# Patient Record
Sex: FEMALE | Race: WHITE | NOT HISPANIC OR LATINO | Employment: OTHER | URBAN - METROPOLITAN AREA
[De-identification: names, ages, dates, MRNs, and addresses within clinical notes are randomized per-mention and may not be internally consistent; named-entity substitution may affect disease eponyms.]

---

## 2017-02-20 ENCOUNTER — OFFICE VISIT (OUTPATIENT)
Dept: OBGYN | Age: 27
End: 2017-02-20

## 2017-02-20 VITALS
WEIGHT: 164.7 LBS | DIASTOLIC BLOOD PRESSURE: 72 MMHG | SYSTOLIC BLOOD PRESSURE: 110 MMHG | BODY MASS INDEX: 27.44 KG/M2 | HEIGHT: 65 IN

## 2017-02-20 DIAGNOSIS — Z01.419 WELL WOMAN EXAM WITH ROUTINE GYNECOLOGICAL EXAM: Primary | ICD-10-CM

## 2017-02-20 DIAGNOSIS — Z12.4 PAP SMEAR FOR CERVICAL CANCER SCREENING: ICD-10-CM

## 2017-02-20 PROCEDURE — 88142 CYTOPATH C/V THIN LAYER: CPT | Performed by: INTERNAL MEDICINE

## 2017-02-20 PROCEDURE — 99395 PREV VISIT EST AGE 18-39: CPT | Performed by: OBSTETRICS & GYNECOLOGY

## 2017-02-20 RX ORDER — LORATADINE 10 MG/1
10 TABLET ORAL DAILY PRN
COMMUNITY

## 2017-02-23 LAB — CYTOLOGY CVX/VAG DOC THIN PREP: NORMAL

## 2017-06-08 ENCOUNTER — OFFICE VISIT (OUTPATIENT)
Dept: OBGYN | Age: 27
End: 2017-06-08

## 2017-06-08 VITALS
WEIGHT: 159.7 LBS | HEIGHT: 65 IN | SYSTOLIC BLOOD PRESSURE: 102 MMHG | DIASTOLIC BLOOD PRESSURE: 62 MMHG | BODY MASS INDEX: 26.61 KG/M2

## 2017-06-08 DIAGNOSIS — N97.0 OLIGO-OVULATION: Primary | ICD-10-CM

## 2017-06-08 PROBLEM — N97.9 SECONDARY FEMALE INFERTILITY: Status: ACTIVE | Noted: 2017-06-08

## 2017-06-08 PROCEDURE — 99214 OFFICE O/P EST MOD 30 MIN: CPT | Performed by: OBSTETRICS & GYNECOLOGY

## 2017-06-19 ENCOUNTER — LAB SERVICES (OUTPATIENT)
Dept: LAB | Age: 27
End: 2017-06-19

## 2017-06-19 DIAGNOSIS — N97.0 OLIGO-OVULATION: ICD-10-CM

## 2017-06-19 PROCEDURE — 36415 COLL VENOUS BLD VENIPUNCTURE: CPT | Performed by: INTERNAL MEDICINE

## 2017-06-19 PROCEDURE — 84144 ASSAY OF PROGESTERONE: CPT | Performed by: INTERNAL MEDICINE

## 2017-06-20 ENCOUNTER — TELEPHONE (OUTPATIENT)
Dept: OBGYN | Age: 27
End: 2017-06-20

## 2017-06-20 LAB — PROGEST SERPL-MCNC: 10.17 NG/ML

## 2017-07-24 ENCOUNTER — TELEPHONE (OUTPATIENT)
Dept: OBGYN | Age: 27
End: 2017-07-24

## 2017-07-24 DIAGNOSIS — O36.8390 UNABLE TO HEAR FETAL HEART TONES AS REASON FOR ULTRASOUND SCAN: ICD-10-CM

## 2017-07-24 DIAGNOSIS — Z34.80 SUPERVISION OF OTHER NORMAL PREGNANCY, ANTEPARTUM: Primary | ICD-10-CM

## 2017-07-24 RX ORDER — ZINC OXIDE 13 %
CREAM (GRAM) TOPICAL
Status: SHIPPED | COMMUNITY
Start: 2017-07-24 | End: 2017-09-21 | Stop reason: ALTCHOICE

## 2017-08-22 ENCOUNTER — IMAGING SERVICES (OUTPATIENT)
Dept: ULTRASOUND IMAGING | Age: 27
End: 2017-08-22

## 2017-08-22 ENCOUNTER — APPOINTMENT (OUTPATIENT)
Dept: OBGYN | Age: 27
End: 2017-08-22

## 2017-08-22 ENCOUNTER — FIRST OB VISIT (OUTPATIENT)
Dept: OBGYN | Age: 27
End: 2017-08-22

## 2017-08-22 ENCOUNTER — LAB SERVICES (OUTPATIENT)
Dept: LAB | Age: 27
End: 2017-08-22

## 2017-08-22 VITALS
SYSTOLIC BLOOD PRESSURE: 110 MMHG | WEIGHT: 163.1 LBS | DIASTOLIC BLOOD PRESSURE: 60 MMHG | BODY MASS INDEX: 27.17 KG/M2 | HEIGHT: 65 IN

## 2017-08-22 DIAGNOSIS — Z11.3 SCREENING EXAMINATION FOR STD (SEXUALLY TRANSMITTED DISEASE): ICD-10-CM

## 2017-08-22 DIAGNOSIS — Z34.80 SUPERVISION OF OTHER NORMAL PREGNANCY, ANTEPARTUM: ICD-10-CM

## 2017-08-22 DIAGNOSIS — Z34.80 SUPERVISION OF OTHER NORMAL PREGNANCY, ANTEPARTUM: Primary | ICD-10-CM

## 2017-08-22 DIAGNOSIS — O36.8390 UNABLE TO HEAR FETAL HEART TONES AS REASON FOR ULTRASOUND SCAN: ICD-10-CM

## 2017-08-22 PROBLEM — N97.9 SECONDARY FEMALE INFERTILITY: Status: RESOLVED | Noted: 2017-06-08 | Resolved: 2017-08-22

## 2017-08-22 LAB
APPEARANCE UR: CLEAR
BACTERIA #/AREA URNS HPF: ABNORMAL /HPF
BASO+EOS+MONOS # BLD AUTO: 0.7 K/MCL (ref 0.1–1.1)
BASO+EOS+MONOS NFR BLD AUTO: 6 %
BILIRUB UR QL STRIP: NEGATIVE
COLOR UR: YELLOW
DIFFERENTIAL METHOD BLD: ABNORMAL
ERYTHROCYTE [DISTWIDTH] IN BLOOD: 13.4 % (ref 11–15)
GLUCOSE UR STRIP-MCNC: NEGATIVE MG/DL
HCT VFR BLD CALC: 37.6 % (ref 36–46.5)
HGB BLD-MCNC: 12.8 G/DL (ref 12–15.5)
HGB UR QL STRIP: NEGATIVE
HYALINE CASTS #/AREA URNS LPF: ABNORMAL /LPF (ref 0–5)
KETONES UR STRIP-MCNC: 40 MG/DL
LEUKOCYTE ESTERASE UR QL STRIP: NEGATIVE
LYMPHOCYTES # BLD AUTO: 1.5 K/MCL (ref 1–4.8)
LYMPHOCYTES NFR BLD AUTO: 14 %
MCH RBC QN AUTO: 28.1 PG (ref 26–34)
MCHC RBC AUTO-ENTMCNC: 34 G/DL (ref 32–36.5)
MCV RBC AUTO: 82.5 FL (ref 78–100)
NEUTROPHILS # BLD AUTO: 8.3 K/MCL (ref 1.8–7.7)
NEUTROPHILS NFR BLD AUTO: 80 %
NITRITE UR QL STRIP: NEGATIVE
PH UR STRIP: 5.5 UNITS (ref 5–7)
PLATELET # BLD: 239 K/MCL (ref 140–450)
PROT UR STRIP-MCNC: NEGATIVE MG/DL
RBC # BLD: 4.56 MIL/MCL (ref 4–5.2)
RBC #/AREA URNS HPF: ABNORMAL /HPF (ref 0–3)
SP GR UR STRIP: 1.02 (ref 1–1.03)
SPECIMEN SOURCE: ABNORMAL
SQUAMOUS #/AREA URNS HPF: ABNORMAL /HPF (ref 0–5)
UROBILINOGEN UR STRIP-MCNC: 0.2 MG/DL (ref 0–1)
WBC # BLD: 10.5 K/MCL (ref 4.2–11)
WBC #/AREA URNS HPF: ABNORMAL /HPF (ref 0–5)

## 2017-08-22 PROCEDURE — 76817 TRANSVAGINAL US OBSTETRIC: CPT | Performed by: OBSTETRICS & GYNECOLOGY

## 2017-08-22 PROCEDURE — 86901 BLOOD TYPING SEROLOGIC RH(D): CPT | Performed by: INTERNAL MEDICINE

## 2017-08-22 PROCEDURE — 87389 HIV-1 AG W/HIV-1&-2 AB AG IA: CPT | Performed by: INTERNAL MEDICINE

## 2017-08-22 PROCEDURE — 87340 HEPATITIS B SURFACE AG IA: CPT | Performed by: INTERNAL MEDICINE

## 2017-08-22 PROCEDURE — 86762 RUBELLA ANTIBODY: CPT | Performed by: INTERNAL MEDICINE

## 2017-08-22 PROCEDURE — 86900 BLOOD TYPING SEROLOGIC ABO: CPT | Performed by: INTERNAL MEDICINE

## 2017-08-22 PROCEDURE — 85025 COMPLETE CBC W/AUTO DIFF WBC: CPT | Performed by: INTERNAL MEDICINE

## 2017-08-22 PROCEDURE — 86780 TREPONEMA PALLIDUM: CPT | Performed by: INTERNAL MEDICINE

## 2017-08-22 PROCEDURE — 87491 CHLMYD TRACH DNA AMP PROBE: CPT | Performed by: INTERNAL MEDICINE

## 2017-08-22 PROCEDURE — 36415 COLL VENOUS BLD VENIPUNCTURE: CPT | Performed by: INTERNAL MEDICINE

## 2017-08-22 PROCEDURE — 83036 HEMOGLOBIN GLYCOSYLATED A1C: CPT | Performed by: INTERNAL MEDICINE

## 2017-08-22 PROCEDURE — 0500F INITIAL PRENATAL CARE VISIT: CPT | Performed by: OBSTETRICS & GYNECOLOGY

## 2017-08-22 PROCEDURE — 81001 URINALYSIS AUTO W/SCOPE: CPT | Performed by: INTERNAL MEDICINE

## 2017-08-22 PROCEDURE — 86787 VARICELLA-ZOSTER ANTIBODY: CPT | Performed by: INTERNAL MEDICINE

## 2017-08-22 PROCEDURE — 87088 URINE BACTERIA CULTURE: CPT | Performed by: INTERNAL MEDICINE

## 2017-08-22 PROCEDURE — 86850 RBC ANTIBODY SCREEN: CPT | Performed by: INTERNAL MEDICINE

## 2017-08-22 PROCEDURE — 87086 URINE CULTURE/COLONY COUNT: CPT | Performed by: INTERNAL MEDICINE

## 2017-08-23 LAB
ABO + RH BLD: NORMAL
BACTERIA UR CULT: NORMAL
BLD GP AB SCN SERPL QL: NEGATIVE
C TRACH RRNA SPEC QL NAA+PROBE: NEGATIVE
HBA1C MFR BLD: 5.2 % (ref 4.5–5.6)
HBV SURFACE AG SER QL: NEGATIVE
HIV 1+2 AB+HIV1 P24 AG SERPL QL IA: NONREACTIVE
REPORT STATUS (RPT): NORMAL
RUBV IGG SERPL IA-ACNC: 41 UNITS/ML
SPECIMEN SOURCE: NORMAL
SPECIMEN SOURCE: NORMAL
T PALLIDUM IGG SER QL: NONREACTIVE

## 2017-08-24 LAB — VZV IGG SER IA-ACNC: 3.54 OD RATIO

## 2017-09-21 ENCOUNTER — OB CHECK (OUTPATIENT)
Dept: OBGYN | Age: 27
End: 2017-09-21

## 2017-09-21 VITALS
WEIGHT: 167.2 LBS | HEIGHT: 65 IN | SYSTOLIC BLOOD PRESSURE: 98 MMHG | BODY MASS INDEX: 27.86 KG/M2 | DIASTOLIC BLOOD PRESSURE: 60 MMHG

## 2017-09-21 DIAGNOSIS — Z34.80 SUPERVISION OF OTHER NORMAL PREGNANCY, ANTEPARTUM: Primary | ICD-10-CM

## 2017-09-21 PROCEDURE — 0502F SUBSEQUENT PRENATAL CARE: CPT | Performed by: OBSTETRICS & GYNECOLOGY

## 2017-10-19 ENCOUNTER — OB CHECK (OUTPATIENT)
Dept: OBGYN | Age: 27
End: 2017-10-19

## 2017-10-19 VITALS
DIASTOLIC BLOOD PRESSURE: 60 MMHG | HEIGHT: 65 IN | WEIGHT: 172.4 LBS | BODY MASS INDEX: 28.72 KG/M2 | SYSTOLIC BLOOD PRESSURE: 102 MMHG

## 2017-10-19 DIAGNOSIS — Z34.80 SUPERVISION OF OTHER NORMAL PREGNANCY, ANTEPARTUM: Primary | ICD-10-CM

## 2017-10-19 DIAGNOSIS — Z36.3 ENCOUNTER FOR ANTENATAL SCREENING FOR MALFORMATION USING ULTRASOUND: ICD-10-CM

## 2017-10-19 PROCEDURE — 0502F SUBSEQUENT PRENATAL CARE: CPT | Performed by: OBSTETRICS & GYNECOLOGY

## 2017-11-10 ENCOUNTER — OB CHECK (OUTPATIENT)
Dept: OBGYN | Age: 27
End: 2017-11-10

## 2017-11-10 ENCOUNTER — IMAGING SERVICES (OUTPATIENT)
Dept: ULTRASOUND IMAGING | Age: 27
End: 2017-11-10

## 2017-11-10 VITALS
SYSTOLIC BLOOD PRESSURE: 118 MMHG | DIASTOLIC BLOOD PRESSURE: 78 MMHG | BODY MASS INDEX: 28.66 KG/M2 | HEIGHT: 65 IN | WEIGHT: 172 LBS

## 2017-11-10 VITALS
SYSTOLIC BLOOD PRESSURE: 118 MMHG | DIASTOLIC BLOOD PRESSURE: 78 MMHG | WEIGHT: 172 LBS | BODY MASS INDEX: 28.66 KG/M2 | HEIGHT: 65 IN

## 2017-11-10 DIAGNOSIS — Z34.80 SUPERVISION OF OTHER NORMAL PREGNANCY, ANTEPARTUM: Primary | ICD-10-CM

## 2017-11-10 DIAGNOSIS — Z36.3 ENCOUNTER FOR ANTENATAL SCREENING FOR MALFORMATION USING ULTRASOUND: ICD-10-CM

## 2017-11-10 PROCEDURE — 76805 OB US >/= 14 WKS SNGL FETUS: CPT | Performed by: OBSTETRICS & GYNECOLOGY

## 2017-11-10 PROCEDURE — 0502F SUBSEQUENT PRENATAL CARE: CPT | Performed by: OBSTETRICS & GYNECOLOGY

## 2017-12-07 ENCOUNTER — OB CHECK (OUTPATIENT)
Dept: OBGYN | Age: 27
End: 2017-12-07

## 2017-12-07 ENCOUNTER — LAB SERVICES (OUTPATIENT)
Dept: LAB | Age: 27
End: 2017-12-07

## 2017-12-07 DIAGNOSIS — Z34.90 NORMAL INTRAUTERINE PREGNANCY, ANTEPARTUM: ICD-10-CM

## 2017-12-07 DIAGNOSIS — Z34.90 NORMAL INTRAUTERINE PREGNANCY, ANTEPARTUM: Primary | ICD-10-CM

## 2017-12-07 DIAGNOSIS — Z34.80 SUPERVISION OF OTHER NORMAL PREGNANCY, ANTEPARTUM: ICD-10-CM

## 2017-12-07 LAB
GLUCOSE 1H P GLC SERPL-MCNC: 124 MG/DL (ref 65–139)
HGB BLD-MCNC: 12.1 G/DL (ref 12–15.5)

## 2017-12-07 PROCEDURE — 85018 HEMOGLOBIN: CPT | Performed by: INTERNAL MEDICINE

## 2017-12-07 PROCEDURE — 0502F SUBSEQUENT PRENATAL CARE: CPT | Performed by: OBSTETRICS & GYNECOLOGY

## 2017-12-07 PROCEDURE — 82950 GLUCOSE TEST: CPT | Performed by: INTERNAL MEDICINE

## 2017-12-07 PROCEDURE — 36415 COLL VENOUS BLD VENIPUNCTURE: CPT | Performed by: INTERNAL MEDICINE

## 2017-12-07 ASSESSMENT — PAIN SCALES - GENERAL: PAINLEVEL: 0

## 2018-01-11 ENCOUNTER — OB CHECK (OUTPATIENT)
Dept: OBGYN | Age: 28
End: 2018-01-11

## 2018-01-11 VITALS
WEIGHT: 178.6 LBS | HEIGHT: 65 IN | SYSTOLIC BLOOD PRESSURE: 108 MMHG | DIASTOLIC BLOOD PRESSURE: 64 MMHG | BODY MASS INDEX: 29.76 KG/M2

## 2018-01-11 DIAGNOSIS — Z34.80 SUPERVISION OF OTHER NORMAL PREGNANCY, ANTEPARTUM: Primary | ICD-10-CM

## 2018-01-11 PROCEDURE — 0502F SUBSEQUENT PRENATAL CARE: CPT | Performed by: OBSTETRICS & GYNECOLOGY

## 2018-02-02 ENCOUNTER — OB CHECK (OUTPATIENT)
Dept: OBGYN | Age: 28
End: 2018-02-02

## 2018-02-02 VITALS
SYSTOLIC BLOOD PRESSURE: 108 MMHG | BODY MASS INDEX: 30.69 KG/M2 | DIASTOLIC BLOOD PRESSURE: 70 MMHG | HEIGHT: 65 IN | WEIGHT: 184.2 LBS

## 2018-02-02 DIAGNOSIS — Z34.80 SUPERVISION OF OTHER NORMAL PREGNANCY, ANTEPARTUM: Primary | ICD-10-CM

## 2018-02-02 DIAGNOSIS — Z23 NEED FOR TDAP VACCINATION: ICD-10-CM

## 2018-02-02 PROCEDURE — 90471 IMMUNIZATION ADMIN: CPT | Performed by: OBSTETRICS & GYNECOLOGY

## 2018-02-02 PROCEDURE — 90715 TDAP VACCINE 7 YRS/> IM: CPT | Performed by: OBSTETRICS & GYNECOLOGY

## 2018-02-02 PROCEDURE — 0502F SUBSEQUENT PRENATAL CARE: CPT | Performed by: OBSTETRICS & GYNECOLOGY

## 2018-02-22 ENCOUNTER — OB CHECK (OUTPATIENT)
Dept: OBGYN | Age: 28
End: 2018-02-22

## 2018-02-22 VITALS
SYSTOLIC BLOOD PRESSURE: 118 MMHG | HEIGHT: 65 IN | DIASTOLIC BLOOD PRESSURE: 64 MMHG | WEIGHT: 185.6 LBS | BODY MASS INDEX: 30.92 KG/M2

## 2018-02-22 DIAGNOSIS — Z34.80 SUPERVISION OF OTHER NORMAL PREGNANCY, ANTEPARTUM: Primary | ICD-10-CM

## 2018-02-22 PROCEDURE — 0502F SUBSEQUENT PRENATAL CARE: CPT | Performed by: OBSTETRICS & GYNECOLOGY

## 2018-03-08 ENCOUNTER — OB CHECK (OUTPATIENT)
Dept: OBGYN | Age: 28
End: 2018-03-08

## 2018-03-08 VITALS
WEIGHT: 187.1 LBS | HEIGHT: 65 IN | BODY MASS INDEX: 31.17 KG/M2 | DIASTOLIC BLOOD PRESSURE: 62 MMHG | SYSTOLIC BLOOD PRESSURE: 98 MMHG

## 2018-03-08 DIAGNOSIS — Z34.80 SUPERVISION OF OTHER NORMAL PREGNANCY, ANTEPARTUM: Primary | ICD-10-CM

## 2018-03-08 PROCEDURE — 0502F SUBSEQUENT PRENATAL CARE: CPT | Performed by: OBSTETRICS & GYNECOLOGY

## 2018-03-08 PROCEDURE — 87081 CULTURE SCREEN ONLY: CPT | Performed by: INTERNAL MEDICINE

## 2018-03-10 LAB
GP B STREP SPEC QL CULT: ABNORMAL
REPORT STATUS (RPT): ABNORMAL
SPECIMEN SOURCE: ABNORMAL

## 2018-03-12 ENCOUNTER — TELEPHONE (OUTPATIENT)
Dept: OBGYN | Age: 28
End: 2018-03-12

## 2018-03-12 DIAGNOSIS — Z34.80 SUPERVISION OF OTHER NORMAL PREGNANCY, ANTEPARTUM: Primary | ICD-10-CM

## 2018-03-12 DIAGNOSIS — B95.1 POSITIVE GBS TEST: ICD-10-CM

## 2018-03-16 ENCOUNTER — OB CHECK (OUTPATIENT)
Dept: OBGYN | Age: 28
End: 2018-03-16

## 2018-03-16 VITALS
SYSTOLIC BLOOD PRESSURE: 106 MMHG | BODY MASS INDEX: 31.79 KG/M2 | HEIGHT: 65 IN | WEIGHT: 190.8 LBS | DIASTOLIC BLOOD PRESSURE: 64 MMHG

## 2018-03-16 DIAGNOSIS — B95.1 POSITIVE GBS TEST: ICD-10-CM

## 2018-03-16 DIAGNOSIS — Z34.80 SUPERVISION OF OTHER NORMAL PREGNANCY, ANTEPARTUM: Primary | ICD-10-CM

## 2018-03-16 PROCEDURE — 59426 ANTEPARTUM CARE ONLY: CPT | Performed by: OBSTETRICS & GYNECOLOGY

## 2018-03-21 ENCOUNTER — TELEPHONE (OUTPATIENT)
Dept: OBGYN | Age: 28
End: 2018-03-21

## 2018-03-26 ENCOUNTER — OB CHECK (OUTPATIENT)
Dept: OBGYN | Age: 28
End: 2018-03-26

## 2018-03-26 VITALS
SYSTOLIC BLOOD PRESSURE: 102 MMHG | BODY MASS INDEX: 31.86 KG/M2 | HEIGHT: 65 IN | WEIGHT: 191.2 LBS | DIASTOLIC BLOOD PRESSURE: 62 MMHG

## 2018-03-26 DIAGNOSIS — Z34.80 SUPERVISION OF OTHER NORMAL PREGNANCY, ANTEPARTUM: Primary | ICD-10-CM

## 2018-03-26 DIAGNOSIS — B95.1 POSITIVE GBS TEST: ICD-10-CM

## 2018-03-26 PROCEDURE — 0502F SUBSEQUENT PRENATAL CARE: CPT | Performed by: OBSTETRICS & GYNECOLOGY

## 2018-03-26 RX ORDER — BREAST PUMP
EACH MISCELLANEOUS
Qty: 1 EACH | Refills: 0 | Status: SHIPPED | OUTPATIENT
Start: 2018-03-26

## 2018-03-27 ENCOUNTER — ANESTHESIA (OUTPATIENT)
Dept: OBGYN | Age: 28
End: 2018-03-27

## 2018-03-27 ENCOUNTER — NURSE TRIAGE (OUTPATIENT)
Dept: TELEHEALTH | Age: 28
End: 2018-03-27

## 2018-03-27 ENCOUNTER — HOSPITAL ENCOUNTER (INPATIENT)
Age: 28
LOS: 2 days | Discharge: HOME | End: 2018-03-29
Attending: OBSTETRICS & GYNECOLOGY | Admitting: OBSTETRICS & GYNECOLOGY

## 2018-03-27 ENCOUNTER — ANESTHESIA EVENT (OUTPATIENT)
Dept: OBGYN | Age: 28
End: 2018-03-27

## 2018-03-27 LAB
ABO + RH BLD: NORMAL
BASOPHILS # BLD AUTO: 0 K/MCL (ref 0–0.3)
BASOPHILS NFR BLD AUTO: 0 %
BLD GP AB SCN SERPL QL: NEGATIVE
CROSSMATCH EXPIRE: NORMAL
DIFFERENTIAL METHOD BLD: ABNORMAL
EOSINOPHIL # BLD AUTO: 0.1 K/MCL (ref 0.1–0.5)
EOSINOPHIL NFR SPEC: 1 %
ERYTHROCYTE [DISTWIDTH] IN BLOOD: 13.4 % (ref 11–15)
HCT VFR BLD CALC: 38 % (ref 36–46.5)
HGB BLD-MCNC: 12.8 G/DL (ref 12–15.5)
LYMPHOCYTES # BLD MANUAL: 1.9 K/MCL (ref 1–4.8)
LYMPHOCYTES NFR BLD MANUAL: 14 %
MCH RBC QN AUTO: 27.9 PG (ref 26–34)
MCHC RBC AUTO-ENTMCNC: 33.7 G/DL (ref 32–36.5)
MCV RBC AUTO: 83 FL (ref 78–100)
MONOCYTES # BLD MANUAL: 1.7 K/MCL (ref 0.3–0.9)
MONOCYTES NFR BLD MANUAL: 12 %
NEUTROPHILS # BLD: 10.2 K/MCL (ref 1.8–7.7)
NEUTROPHILS NFR BLD AUTO: 73 %
PLATELET # BLD: 160 K/MCL (ref 140–450)
RBC # BLD: 4.58 MIL/MCL (ref 4–5.2)
WBC # BLD: 13.9 K/MCL (ref 4.2–11)

## 2018-03-27 PROCEDURE — 86901 BLOOD TYPING SEROLOGIC RH(D): CPT

## 2018-03-27 PROCEDURE — 10002803 HB RX 637: Performed by: OBSTETRICS & GYNECOLOGY

## 2018-03-27 PROCEDURE — 0KQM0ZZ REPAIR PERINEUM MUSCLE, OPEN APPROACH: ICD-10-PCS | Performed by: OBSTETRICS & GYNECOLOGY

## 2018-03-27 PROCEDURE — 85025 COMPLETE CBC W/AUTO DIFF WBC: CPT

## 2018-03-27 PROCEDURE — 86850 RBC ANTIBODY SCREEN: CPT

## 2018-03-27 PROCEDURE — 10002801 HB RX 250 W/O HCPCS: Performed by: OBSTETRICS & GYNECOLOGY

## 2018-03-27 PROCEDURE — 10002800 HB RX 250 W HCPCS: Performed by: OBSTETRICS & GYNECOLOGY

## 2018-03-27 PROCEDURE — 10001788 HB DELIVERY VAGINAL

## 2018-03-27 PROCEDURE — 10003555 HB ANESTH OB EPIDURAL INSERTION

## 2018-03-27 PROCEDURE — 10002807 HB RX 258: Performed by: OBSTETRICS & GYNECOLOGY

## 2018-03-27 PROCEDURE — 10000003 HB ROOM CHARGE WOMEN'S HEALTH

## 2018-03-27 PROCEDURE — 59409 OBSTETRICAL CARE: CPT | Performed by: OBSTETRICS & GYNECOLOGY

## 2018-03-27 PROCEDURE — 99281 EMR DPT VST MAYX REQ PHY/QHP: CPT

## 2018-03-27 PROCEDURE — 10002800 HB RX 250 W HCPCS: Performed by: ANESTHESIOLOGY

## 2018-03-27 PROCEDURE — 10002801 HB RX 250 W/O HCPCS: Performed by: ANESTHESIOLOGY

## 2018-03-27 PROCEDURE — 10002807 HB RX 258: Performed by: ANESTHESIOLOGY

## 2018-03-27 RX ORDER — HEPARIN SOD,PORK IN 0.45% NACL 25000/500
INTRAVENOUS SOLUTION INTRAVENOUS CONTINUOUS
Status: DISCONTINUED | OUTPATIENT
Start: 2018-03-27 | End: 2018-03-27

## 2018-03-27 RX ORDER — LIDOCAINE HYDROCHLORIDE 10 MG/ML
30 INJECTION, SOLUTION EPIDURAL; INFILTRATION; INTRACAUDAL; PERINEURAL
Status: COMPLETED | OUTPATIENT
Start: 2018-03-27 | End: 2018-03-27

## 2018-03-27 RX ORDER — SODIUM CHLORIDE, SODIUM LACTATE, POTASSIUM CHLORIDE, CALCIUM CHLORIDE 600; 310; 30; 20 MG/100ML; MG/100ML; MG/100ML; MG/100ML
INJECTION, SOLUTION INTRAVENOUS CONTINUOUS
Status: DISCONTINUED | OUTPATIENT
Start: 2018-03-27 | End: 2018-03-27

## 2018-03-27 RX ORDER — ACETAMINOPHEN 325 MG/1
650 TABLET ORAL EVERY 4 HOURS PRN
Status: DISCONTINUED | OUTPATIENT
Start: 2018-03-27 | End: 2018-03-29 | Stop reason: HOSPADM

## 2018-03-27 RX ORDER — 0.9 % SODIUM CHLORIDE 0.9 %
2 VIAL (ML) INJECTION PRN
Status: DISCONTINUED | OUTPATIENT
Start: 2018-03-27 | End: 2018-03-27

## 2018-03-27 RX ORDER — LIDOCAINE HYDROCHLORIDE 10 MG/ML
1 INJECTION, SOLUTION EPIDURAL; INFILTRATION; INTRACAUDAL; PERINEURAL ONCE
Status: DISCONTINUED | OUTPATIENT
Start: 2018-03-27 | End: 2018-03-27

## 2018-03-27 RX ORDER — ETHYL CHLORIDE 100 %
1 AEROSOL, SPRAY (ML) TOPICAL ONCE
Status: DISCONTINUED | OUTPATIENT
Start: 2018-03-27 | End: 2018-03-27

## 2018-03-27 RX ORDER — NALBUPHINE HYDROCHLORIDE 10 MG/ML
10 INJECTION, SOLUTION INTRAMUSCULAR; INTRAVENOUS; SUBCUTANEOUS
Status: DISCONTINUED | OUTPATIENT
Start: 2018-03-27 | End: 2018-03-27 | Stop reason: ALTCHOICE

## 2018-03-27 RX ORDER — IBUPROFEN 600 MG/1
600 TABLET ORAL EVERY 6 HOURS PRN
Status: DISCONTINUED | OUTPATIENT
Start: 2018-03-27 | End: 2018-03-29 | Stop reason: HOSPADM

## 2018-03-27 RX ORDER — HYDROCODONE BITARTRATE AND ACETAMINOPHEN 5; 325 MG/1; MG/1
1-2 TABLET ORAL EVERY 4 HOURS PRN
Status: DISCONTINUED | OUTPATIENT
Start: 2018-03-27 | End: 2018-03-29 | Stop reason: HOSPADM

## 2018-03-27 RX ORDER — AMOXICILLIN 250 MG
2 CAPSULE ORAL DAILY PRN
Status: DISCONTINUED | OUTPATIENT
Start: 2018-03-27 | End: 2018-03-29 | Stop reason: HOSPADM

## 2018-03-27 RX ORDER — HYDROCORTISONE ACETATE 25 MG/1
25 SUPPOSITORY RECTAL EVERY 8 HOURS PRN
Status: DISCONTINUED | OUTPATIENT
Start: 2018-03-27 | End: 2018-03-29 | Stop reason: HOSPADM

## 2018-03-27 RX ORDER — EPHEDRINE SULFATE/0.9% NACL/PF 50 MG/10ML
5-10 SYRINGE (ML) INTRAVENOUS
Status: DISCONTINUED | OUTPATIENT
Start: 2018-03-27 | End: 2018-03-27

## 2018-03-27 RX ORDER — 0.9 % SODIUM CHLORIDE 0.9 %
2 VIAL (ML) INJECTION EVERY 12 HOURS SCHEDULED
Status: DISCONTINUED | OUTPATIENT
Start: 2018-03-27 | End: 2018-03-27

## 2018-03-27 RX ORDER — SIMETHICONE 80 MG
80 TABLET,CHEWABLE ORAL 4 TIMES DAILY PRN
Status: DISCONTINUED | OUTPATIENT
Start: 2018-03-27 | End: 2018-03-29 | Stop reason: HOSPADM

## 2018-03-27 RX ORDER — OXYTOCIN 10 [USP'U]/ML
10 INJECTION, SOLUTION INTRAMUSCULAR; INTRAVENOUS ONCE
Status: DISCONTINUED | OUTPATIENT
Start: 2018-03-27 | End: 2018-03-27

## 2018-03-27 RX ADMIN — SODIUM CHLORIDE, POTASSIUM CHLORIDE, SODIUM LACTATE AND CALCIUM CHLORIDE 1000 ML: 600; 310; 30; 20 INJECTION, SOLUTION INTRAVENOUS at 19:09

## 2018-03-27 RX ADMIN — LIDOCAINE HYDROCHLORIDE 10 MG: 10 INJECTION, SOLUTION EPIDURAL; INFILTRATION; INTRACAUDAL; PERINEURAL at 20:14

## 2018-03-27 RX ADMIN — FENTANYL CITRATE 20 MCG: 50 INJECTION INTRAMUSCULAR; INTRAVENOUS at 18:49

## 2018-03-27 RX ADMIN — IBUPROFEN 600 MG: 600 TABLET, FILM COATED ORAL at 21:43

## 2018-03-27 RX ADMIN — SODIUM CHLORIDE, POTASSIUM CHLORIDE, SODIUM LACTATE AND CALCIUM CHLORIDE: 600; 310; 30; 20 INJECTION, SOLUTION INTRAVENOUS at 18:33

## 2018-03-27 RX ADMIN — SODIUM CHLORIDE 5 MILLION UNITS: 9 INJECTION, SOLUTION INTRAVENOUS at 19:04

## 2018-03-27 RX ADMIN — BENZOCAINE AND LEVOMENTHOL 1 SPRAY: 200; 5 SPRAY TOPICAL at 21:48

## 2018-03-27 RX ADMIN — FENTANYL 0.2 MG/100ML-BUPIV 0.125%-NACL 0.9% EPIDURAL INJ 12 ML/HR: 2/0.125 SOLUTION at 19:00

## 2018-03-27 RX ADMIN — Medication 125 ML/HR: at 20:39

## 2018-03-27 ASSESSMENT — ACTIVITIES OF DAILY LIVING (ADL)
CHRONIC_PAIN_PRESENT: NO
ADL_SHORT_OF_BREATH: NO
RECENT_DECLINE_ADL: NO
ADL_BEFORE_ADMISSION: INDEPENDENT
ADL_SCORE: 12
NEEDS_ASSIST: NO

## 2018-03-27 ASSESSMENT — PAIN SCALES - GENERAL
PAIN_LEVEL_AT_REST: 1
PAIN_LEVEL_WITH_ACTIVITY: 2
PAIN_LEVEL_AT_REST: 8
PAIN_LEVEL_AT_REST: 0
PAIN_LEVEL_WITH_ACTIVITY: 1
PAIN_LEVEL_WITH_ACTIVITY: 1
PAIN_LEVEL_AT_REST: 0
PAIN_LEVEL_AT_REST: 2

## 2018-03-27 ASSESSMENT — LIFESTYLE VARIABLES
HOW OFTEN DO YOU HAVE A DRINK CONTAINING ALCOHOL: NEVER
HOW OFTEN DO YOU HAVE 6 OR MORE DRINKS ON ONE OCCASION: NEVER
AUDIT-C TOTAL SCORE: 0
E-CIGARETTE_USE: NO E-CIGARETTES USE IN THE LAST 30 DAYS
HOW MANY STANDARD DRINKS CONTAINING ALCOHOL DO YOU HAVE ON A TYPICAL DAY: 0,1 OR 2
ALCOHOL_USE_STATUS: NO OR LOW RISK WITH VALIDATED TOOL

## 2018-03-27 ASSESSMENT — COGNITIVE AND FUNCTIONAL STATUS - GENERAL
ARE YOU DEAF OR DO YOU HAVE SERIOUS DIFFICULTY  HEARING: NO
ARE YOU BLIND OR DO YOU HAVE SERIOUS DIFFICULTY SEEING, EVEN WHEN WEARING GLASSES: NO

## 2018-03-27 ASSESSMENT — PATIENT HEALTH QUESTIONNAIRE - PHQ9: IS PATIENT ABLE TO COMPLETE PHQ2 OR PHQ9: YES

## 2018-03-28 LAB
HCT VFR BLD CALC: 35 % (ref 36–46.5)
HGB BLD-MCNC: 11.5 G/DL (ref 12–15.5)

## 2018-03-28 PROCEDURE — 10000003 HB ROOM CHARGE WOMEN'S HEALTH

## 2018-03-28 PROCEDURE — 85014 HEMATOCRIT: CPT

## 2018-03-28 PROCEDURE — 10002803 HB RX 637: Performed by: OBSTETRICS & GYNECOLOGY

## 2018-03-28 PROCEDURE — 36415 COLL VENOUS BLD VENIPUNCTURE: CPT

## 2018-03-28 PROCEDURE — 10004651 HB RX, NO CHARGE ITEM: Performed by: OBSTETRICS & GYNECOLOGY

## 2018-03-28 RX ADMIN — IBUPROFEN 600 MG: 600 TABLET, FILM COATED ORAL at 20:23

## 2018-03-28 RX ADMIN — DOCUSATE SODIUM,SENNOSIDES 2 TABLET: 50; 8.6 TABLET, FILM COATED ORAL at 10:09

## 2018-03-28 RX ADMIN — ACETAMINOPHEN 650 MG: 325 TABLET ORAL at 10:09

## 2018-03-28 RX ADMIN — IBUPROFEN 600 MG: 600 TABLET, FILM COATED ORAL at 05:01

## 2018-03-28 RX ADMIN — IBUPROFEN 600 MG: 600 TABLET, FILM COATED ORAL at 13:38

## 2018-03-28 ASSESSMENT — PAIN SCALES - GENERAL
PAIN_LEVEL_AT_REST: 1
PAIN_LEVEL_AT_REST: 2
PAIN_LEVEL_AT_REST: 1
PAIN_LEVEL_AT_REST: 7
PAIN_LEVEL_AT_REST: 1
PAIN_LEVEL_AT_REST: 2
PAIN_LEVEL_AT_REST: 6
PAIN_LEVEL_WITH_ACTIVITY: 0
PAIN_LEVEL_AT_REST: 0
PAIN_LEVEL_AT_REST: 4

## 2018-03-29 VITALS
BODY MASS INDEX: 31.86 KG/M2 | WEIGHT: 191.2 LBS | RESPIRATION RATE: 16 BRPM | DIASTOLIC BLOOD PRESSURE: 72 MMHG | HEIGHT: 65 IN | HEART RATE: 72 BPM | OXYGEN SATURATION: 99 % | TEMPERATURE: 98.4 F | SYSTOLIC BLOOD PRESSURE: 124 MMHG

## 2018-03-29 PROCEDURE — 10004651 HB RX, NO CHARGE ITEM: Performed by: OBSTETRICS & GYNECOLOGY

## 2018-03-29 PROCEDURE — 10002803 HB RX 637: Performed by: OBSTETRICS & GYNECOLOGY

## 2018-03-29 RX ADMIN — ACETAMINOPHEN 650 MG: 325 TABLET ORAL at 08:39

## 2018-03-29 RX ADMIN — IBUPROFEN 600 MG: 600 TABLET, FILM COATED ORAL at 04:33

## 2018-03-29 ASSESSMENT — PAIN SCALES - GENERAL
PAIN_LEVEL_AT_REST: 7
PAIN_LEVEL_AT_REST: 0
PAIN_LEVEL_AT_REST: 5

## 2018-03-31 ENCOUNTER — TELEPHONE (OUTPATIENT)
Dept: OBGYN | Age: 28
End: 2018-03-31

## 2018-04-05 ENCOUNTER — APPOINTMENT (OUTPATIENT)
Dept: OBGYN | Age: 28
End: 2018-04-05

## 2018-05-15 ENCOUNTER — OFFICE VISIT (OUTPATIENT)
Dept: OBGYN | Age: 28
End: 2018-05-15

## 2018-05-15 ENCOUNTER — TELEPHONE (OUTPATIENT)
Dept: OBGYN | Age: 28
End: 2018-05-15

## 2018-05-15 VITALS
BODY MASS INDEX: 29.49 KG/M2 | DIASTOLIC BLOOD PRESSURE: 62 MMHG | SYSTOLIC BLOOD PRESSURE: 106 MMHG | HEIGHT: 65 IN | WEIGHT: 177 LBS

## 2018-05-15 PROBLEM — B95.1 POSITIVE GBS TEST: Status: RESOLVED | Noted: 2018-03-12 | Resolved: 2018-05-15

## 2018-05-15 RX ORDER — ACETAMINOPHEN AND CODEINE PHOSPHATE 120; 12 MG/5ML; MG/5ML
1 SOLUTION ORAL DAILY
Qty: 28 TABLET | Refills: 11 | Status: SHIPPED | OUTPATIENT
Start: 2018-05-15

## 2018-05-15 ASSESSMENT — PATIENT HEALTH QUESTIONNAIRE - PHQ9
SUM OF ALL RESPONSES TO PHQ9 QUESTIONS 1 AND 2: 0
CLINICAL INTERPRETATION OF PHQ2 SCORE: 0

## 2018-06-06 ENCOUNTER — WALK IN (OUTPATIENT)
Dept: URGENT CARE | Age: 28
End: 2018-06-06

## 2018-06-06 VITALS
OXYGEN SATURATION: 96 % | RESPIRATION RATE: 18 BRPM | BODY MASS INDEX: 30.08 KG/M2 | HEIGHT: 64 IN | WEIGHT: 176.2 LBS | HEART RATE: 72 BPM | TEMPERATURE: 97.4 F

## 2018-06-06 DIAGNOSIS — J01.00 ACUTE NON-RECURRENT MAXILLARY SINUSITIS: Primary | ICD-10-CM

## 2018-06-06 PROCEDURE — 99213 OFFICE O/P EST LOW 20 MIN: CPT | Performed by: NURSE PRACTITIONER

## 2018-06-06 RX ORDER — AMOXICILLIN AND CLAVULANATE POTASSIUM 875; 125 MG/1; MG/1
1 TABLET, FILM COATED ORAL 2 TIMES DAILY
Qty: 20 TABLET | Refills: 0 | Status: SHIPPED | OUTPATIENT
Start: 2018-06-06 | End: 2018-06-16

## 2018-07-17 ENCOUNTER — MED INFO FORMS (OUTPATIENT)
Dept: HEALTH INFORMATION MANAGEMENT | Age: 28
End: 2018-07-17

## 2018-07-17 ENCOUNTER — OFF PREMISE (OUTPATIENT)
Dept: HEALTH INFORMATION MANAGEMENT | Age: 28
End: 2018-07-17

## 2020-06-29 ENCOUNTER — APPOINTMENT (OUTPATIENT)
Age: 30
Setting detail: DERMATOLOGY
End: 2020-07-03

## 2020-06-29 DIAGNOSIS — D22 MELANOCYTIC NEVI: ICD-10-CM

## 2020-06-29 PROBLEM — D48.5 NEOPLASM OF UNCERTAIN BEHAVIOR OF SKIN: Status: ACTIVE | Noted: 2020-06-29

## 2020-06-29 PROBLEM — D22.62 MELANOCYTIC NEVI OF LEFT UPPER LIMB, INCLUDING SHOULDER: Status: ACTIVE | Noted: 2020-06-29

## 2020-06-29 PROCEDURE — OTHER COUNSELING: OTHER

## 2020-06-29 PROCEDURE — 99202 OFFICE O/P NEW SF 15 MIN: CPT | Mod: 25

## 2020-06-29 PROCEDURE — 11102 TANGNTL BX SKIN SINGLE LES: CPT

## 2020-06-29 PROCEDURE — OTHER BIOPSY BY SHAVE METHOD: OTHER

## 2020-06-29 ASSESSMENT — LOCATION DETAILED DESCRIPTION DERM: LOCATION DETAILED: LEFT POSTERIOR SHOULDER

## 2020-06-29 ASSESSMENT — LOCATION ZONE DERM: LOCATION ZONE: ARM

## 2020-06-29 ASSESSMENT — LOCATION SIMPLE DESCRIPTION DERM: LOCATION SIMPLE: LEFT SHOULDER

## 2020-06-29 NOTE — PROCEDURE: BIOPSY BY SHAVE METHOD
X Size Of Lesion In Cm: 0
Biopsy Method: Dermablade
Render Post-Care Instructions In Note?: yes
Dressing: bandage
Bill 79952 For Specimen Handling/Conveyance To Laboratory?: no
Anesthesia Volume In Cc (Will Not Render If 0): 0.3
Curettage Text: The wound bed was treated with curettage after the biopsy was performed.
Wound Care: Petrolatum
Electrodesiccation Text: The wound bed was treated with electrodesiccation after the biopsy was performed.
Anesthesia Type: 1% lidocaine with epinephrine
Silver Nitrate Text: The wound bed was treated with silver nitrate after the biopsy was performed.
Hemostasis: Drysol
Notification Instructions: Patient will be notified of biopsy results. However, patient instructed to call the office if not contacted within 2 weeks.
Billing Type: Third-Party Bill
Consent: Written consent was obtained and risks were reviewed including but not limited to scarring, infection, bleeding, scabbing, incomplete removal, nerve damage and allergy to anesthesia.
Post-Care Instructions: I reviewed with the patient in detail post-care instructions. Patient is to keep the biopsy site dry overnight, and then apply bacitracin twice daily until healed. Patient may apply hydrogen peroxide soaks to remove any crusting.
Type Of Destruction Used: Curettage
Cryotherapy Text: The wound bed was treated with cryotherapy after the biopsy was performed.
Depth Of Biopsy: dermis
Electrodesiccation And Curettage Text: The wound bed was treated with electrodesiccation and curettage after the biopsy was performed.
Biopsy Type: H and E
Detail Level: Detailed
Information: Selecting Yes will display possible errors in your note based on the variables you have selected. This validation is only offered as a suggestion for you. PLEASE NOTE THAT THE VALIDATION TEXT WILL BE REMOVED WHEN YOU FINALIZE YOUR NOTE. IF YOU WANT TO FAX A PRELIMINARY NOTE YOU WILL NEED TO TOGGLE THIS TO 'NO' IF YOU DO NOT WANT IT IN YOUR FAXED NOTE.

## 2021-05-24 VITALS
SYSTOLIC BLOOD PRESSURE: 114 MMHG | WEIGHT: 178 LBS | BODY MASS INDEX: 29.85 KG/M2 | HEART RATE: 68 BPM | DIASTOLIC BLOOD PRESSURE: 60 MMHG

## 2022-03-29 LAB
ABO (EXTERNAL): NORMAL
HEPATITIS B SURFACE ANTIGEN (EXTERNAL): NONREACTIVE
HIV1+2 AB SERPL QL IA: NONREACTIVE
RH (EXTERNAL): POSITIVE
RUBELLA ANTIBODY IGG (EXTERNAL): NORMAL
TREPONEMA PALLIDUM ANTIBODY (EXTERNAL): NONREACTIVE

## 2022-06-28 ENCOUNTER — HOSPITAL ENCOUNTER (INPATIENT)
Facility: CLINIC | Age: 32
LOS: 1 days | Discharge: HOME | End: 2022-06-29
Attending: OBSTETRICS & GYNECOLOGY | Admitting: ADVANCED PRACTICE MIDWIFE
Payer: COMMERCIAL

## 2022-06-28 DIAGNOSIS — O36.4XX0 FETAL DEMISE BEFORE 22 WEEKS WITH RETENTION OF DEAD FETUS: Primary | ICD-10-CM

## 2022-06-28 LAB
ABO/RH(D): NORMAL
ANTIBODY SCREEN: NEGATIVE
APTT PPP: 27 SECONDS (ref 22–38)
FACTOR 2 INTERPRETATION: NORMAL
FACTOR V INTERPRETATION: NORMAL
FIBRINOGEN PPP-MCNC: 344 MG/DL (ref 170–490)
GLUCOSE BLD-MCNC: 89 MG/DL (ref 70–125)
HBA1C MFR BLD: 5.1 %
INR PPP: 1.02 (ref 0.85–1.15)
LAB DIRECTOR COMMENTS: NORMAL
LAB DIRECTOR DISCLAIMER: NORMAL
LAB DIRECTOR INTERPRETATION: NORMAL
LAB DIRECTOR METHODOLOGY: NORMAL
LAB DIRECTOR RESULTS: NORMAL
SARS-COV-2 RNA RESP QL NAA+PROBE: NEGATIVE
SPECIMEN DESCRIPTION: NORMAL
SPECIMEN EXPIRATION DATE: NORMAL
TSH SERPL DL<=0.005 MIU/L-ACNC: 0.88 UIU/ML (ref 0.3–5)

## 2022-06-28 PROCEDURE — 87070 CULTURE OTHR SPECIMN AEROBIC: CPT | Performed by: ADVANCED PRACTICE MIDWIFE

## 2022-06-28 PROCEDURE — 36415 COLL VENOUS BLD VENIPUNCTURE: CPT | Performed by: ADVANCED PRACTICE MIDWIFE

## 2022-06-28 PROCEDURE — 87077 CULTURE AEROBIC IDENTIFY: CPT | Performed by: ADVANCED PRACTICE MIDWIFE

## 2022-06-28 PROCEDURE — 86850 RBC ANTIBODY SCREEN: CPT | Performed by: ADVANCED PRACTICE MIDWIFE

## 2022-06-28 PROCEDURE — 86147 CARDIOLIPIN ANTIBODY EA IG: CPT | Performed by: ADVANCED PRACTICE MIDWIFE

## 2022-06-28 PROCEDURE — 86146 BETA-2 GLYCOPROTEIN ANTIBODY: CPT | Performed by: ADVANCED PRACTICE MIDWIFE

## 2022-06-28 PROCEDURE — 88305 TISSUE EXAM BY PATHOLOGIST: CPT | Mod: TC | Performed by: ADVANCED PRACTICE MIDWIFE

## 2022-06-28 PROCEDURE — 722N000001 HC LABOR CARE VAGINAL DELIVERY SINGLE

## 2022-06-28 PROCEDURE — 120N000001 HC R&B MED SURG/OB

## 2022-06-28 PROCEDURE — 250N000013 HC RX MED GY IP 250 OP 250 PS 637: Performed by: ADVANCED PRACTICE MIDWIFE

## 2022-06-28 PROCEDURE — 85730 THROMBOPLASTIN TIME PARTIAL: CPT | Performed by: ADVANCED PRACTICE MIDWIFE

## 2022-06-28 PROCEDURE — 85384 FIBRINOGEN ACTIVITY: CPT | Performed by: ADVANCED PRACTICE MIDWIFE

## 2022-06-28 PROCEDURE — 83036 HEMOGLOBIN GLYCOSYLATED A1C: CPT | Performed by: ADVANCED PRACTICE MIDWIFE

## 2022-06-28 PROCEDURE — 86747 PARVOVIRUS ANTIBODY: CPT | Performed by: ADVANCED PRACTICE MIDWIFE

## 2022-06-28 PROCEDURE — 82947 ASSAY GLUCOSE BLOOD QUANT: CPT | Performed by: ADVANCED PRACTICE MIDWIFE

## 2022-06-28 PROCEDURE — 250N000011 HC RX IP 250 OP 636: Performed by: ADVANCED PRACTICE MIDWIFE

## 2022-06-28 PROCEDURE — 87798 DETECT AGENT NOS DNA AMP: CPT | Performed by: ADVANCED PRACTICE MIDWIFE

## 2022-06-28 PROCEDURE — 250N000009 HC RX 250: Performed by: ADVANCED PRACTICE MIDWIFE

## 2022-06-28 PROCEDURE — 84443 ASSAY THYROID STIM HORMONE: CPT | Performed by: ADVANCED PRACTICE MIDWIFE

## 2022-06-28 PROCEDURE — 85460 HEMOGLOBIN FETAL: CPT | Performed by: ADVANCED PRACTICE MIDWIFE

## 2022-06-28 PROCEDURE — 81240 F2 GENE: CPT | Performed by: ADVANCED PRACTICE MIDWIFE

## 2022-06-28 PROCEDURE — 85300 ANTITHROMBIN III ACTIVITY: CPT | Performed by: ADVANCED PRACTICE MIDWIFE

## 2022-06-28 PROCEDURE — U0005 INFEC AGEN DETEC AMPLI PROBE: HCPCS | Performed by: ADVANCED PRACTICE MIDWIFE

## 2022-06-28 PROCEDURE — 85610 PROTHROMBIN TIME: CPT | Performed by: ADVANCED PRACTICE MIDWIFE

## 2022-06-28 RX ORDER — NALOXONE HYDROCHLORIDE 0.4 MG/ML
0.4 INJECTION, SOLUTION INTRAMUSCULAR; INTRAVENOUS; SUBCUTANEOUS
Status: DISCONTINUED | OUTPATIENT
Start: 2022-06-28 | End: 2022-06-29 | Stop reason: HOSPADM

## 2022-06-28 RX ORDER — CETIRIZINE HYDROCHLORIDE 10 MG/1
10 TABLET ORAL DAILY
Status: ON HOLD | COMMUNITY
End: 2024-02-06

## 2022-06-28 RX ORDER — DOCUSATE SODIUM 100 MG/1
100 CAPSULE, LIQUID FILLED ORAL DAILY
Status: DISCONTINUED | OUTPATIENT
Start: 2022-06-29 | End: 2022-06-29 | Stop reason: HOSPADM

## 2022-06-28 RX ORDER — METHYLERGONOVINE MALEATE 0.2 MG/ML
200 INJECTION INTRAVENOUS
Status: DISCONTINUED | OUTPATIENT
Start: 2022-06-28 | End: 2022-06-29 | Stop reason: HOSPADM

## 2022-06-28 RX ORDER — DIPHENOXYLATE HCL/ATROPINE 2.5-.025MG
2 TABLET ORAL EVERY 6 HOURS PRN
Status: DISCONTINUED | OUTPATIENT
Start: 2022-06-28 | End: 2022-06-28 | Stop reason: HOSPADM

## 2022-06-28 RX ORDER — OXYTOCIN/0.9 % SODIUM CHLORIDE 30/500 ML
340 PLASTIC BAG, INJECTION (ML) INTRAVENOUS CONTINUOUS PRN
Status: DISCONTINUED | OUTPATIENT
Start: 2022-06-28 | End: 2022-06-29 | Stop reason: HOSPADM

## 2022-06-28 RX ORDER — MISOPROSTOL 200 UG/1
800 TABLET ORAL
Status: DISCONTINUED | OUTPATIENT
Start: 2022-06-28 | End: 2022-06-28 | Stop reason: HOSPADM

## 2022-06-28 RX ORDER — OXYTOCIN/0.9 % SODIUM CHLORIDE 30/500 ML
100-340 PLASTIC BAG, INJECTION (ML) INTRAVENOUS CONTINUOUS PRN
Status: DISCONTINUED | OUTPATIENT
Start: 2022-06-28 | End: 2022-06-29 | Stop reason: HOSPADM

## 2022-06-28 RX ORDER — IBUPROFEN 800 MG/1
800 TABLET, FILM COATED ORAL EVERY 6 HOURS PRN
Status: DISCONTINUED | OUTPATIENT
Start: 2022-06-29 | End: 2022-06-29 | Stop reason: HOSPADM

## 2022-06-28 RX ORDER — PROCHLORPERAZINE 25 MG
25 SUPPOSITORY, RECTAL RECTAL EVERY 12 HOURS PRN
Status: DISCONTINUED | OUTPATIENT
Start: 2022-06-28 | End: 2022-06-28 | Stop reason: HOSPADM

## 2022-06-28 RX ORDER — ACETAMINOPHEN 325 MG/1
650 TABLET ORAL EVERY 4 HOURS PRN
Status: DISCONTINUED | OUTPATIENT
Start: 2022-06-28 | End: 2022-06-28 | Stop reason: HOSPADM

## 2022-06-28 RX ORDER — MISOPROSTOL 200 UG/1
400 TABLET ORAL
Status: DISCONTINUED | OUTPATIENT
Start: 2022-06-28 | End: 2022-06-29 | Stop reason: HOSPADM

## 2022-06-28 RX ORDER — HYDROCORTISONE 25 MG/G
CREAM TOPICAL 3 TIMES DAILY PRN
Status: DISCONTINUED | OUTPATIENT
Start: 2022-06-28 | End: 2022-06-29 | Stop reason: HOSPADM

## 2022-06-28 RX ORDER — MORPHINE SULFATE 10 MG/ML
4 INJECTION, SOLUTION INTRAMUSCULAR; INTRAVENOUS
Status: DISCONTINUED | OUTPATIENT
Start: 2022-06-28 | End: 2022-06-28 | Stop reason: HOSPADM

## 2022-06-28 RX ORDER — OXYTOCIN 10 [USP'U]/ML
10 INJECTION, SOLUTION INTRAMUSCULAR; INTRAVENOUS
Status: DISCONTINUED | OUTPATIENT
Start: 2022-06-28 | End: 2022-06-29 | Stop reason: HOSPADM

## 2022-06-28 RX ORDER — KETOROLAC TROMETHAMINE 30 MG/ML
30 INJECTION, SOLUTION INTRAMUSCULAR; INTRAVENOUS
Status: DISCONTINUED | OUTPATIENT
Start: 2022-06-28 | End: 2022-06-29 | Stop reason: HOSPADM

## 2022-06-28 RX ORDER — MISOPROSTOL 200 UG/1
400 TABLET ORAL
Status: DISCONTINUED | OUTPATIENT
Start: 2022-06-28 | End: 2022-06-28 | Stop reason: HOSPADM

## 2022-06-28 RX ORDER — DIPHENOXYLATE HCL/ATROPINE 2.5-.025MG
2 TABLET ORAL ONCE
Status: DISCONTINUED | OUTPATIENT
Start: 2022-06-28 | End: 2022-06-28 | Stop reason: HOSPADM

## 2022-06-28 RX ORDER — METHYLERGONOVINE MALEATE 0.2 MG/ML
200 INJECTION INTRAVENOUS
Status: DISCONTINUED | OUTPATIENT
Start: 2022-06-28 | End: 2022-06-28 | Stop reason: HOSPADM

## 2022-06-28 RX ORDER — NALOXONE HYDROCHLORIDE 0.4 MG/ML
0.4 INJECTION, SOLUTION INTRAMUSCULAR; INTRAVENOUS; SUBCUTANEOUS
Status: DISCONTINUED | OUTPATIENT
Start: 2022-06-28 | End: 2022-06-28 | Stop reason: HOSPADM

## 2022-06-28 RX ORDER — ONDANSETRON 2 MG/ML
4 INJECTION INTRAMUSCULAR; INTRAVENOUS EVERY 6 HOURS PRN
Status: DISCONTINUED | OUTPATIENT
Start: 2022-06-28 | End: 2022-06-28 | Stop reason: HOSPADM

## 2022-06-28 RX ORDER — NALOXONE HYDROCHLORIDE 0.4 MG/ML
0.2 INJECTION, SOLUTION INTRAMUSCULAR; INTRAVENOUS; SUBCUTANEOUS
Status: DISCONTINUED | OUTPATIENT
Start: 2022-06-28 | End: 2022-06-29 | Stop reason: HOSPADM

## 2022-06-28 RX ORDER — OXYTOCIN 10 [USP'U]/ML
10 INJECTION, SOLUTION INTRAMUSCULAR; INTRAVENOUS
Status: DISCONTINUED | OUTPATIENT
Start: 2022-06-28 | End: 2022-06-28 | Stop reason: HOSPADM

## 2022-06-28 RX ORDER — IBUPROFEN 800 MG/1
800 TABLET, FILM COATED ORAL
Status: DISCONTINUED | OUTPATIENT
Start: 2022-06-28 | End: 2022-06-29 | Stop reason: HOSPADM

## 2022-06-28 RX ORDER — CITRIC ACID/SODIUM CITRATE 334-500MG
30 SOLUTION, ORAL ORAL
Status: DISCONTINUED | OUTPATIENT
Start: 2022-06-28 | End: 2022-06-28 | Stop reason: HOSPADM

## 2022-06-28 RX ORDER — OXYCODONE HYDROCHLORIDE 5 MG/1
5 TABLET ORAL EVERY 4 HOURS PRN
Status: DISCONTINUED | OUTPATIENT
Start: 2022-06-28 | End: 2022-06-29 | Stop reason: HOSPADM

## 2022-06-28 RX ORDER — CARBOPROST TROMETHAMINE 250 UG/ML
250 INJECTION, SOLUTION INTRAMUSCULAR
Status: DISCONTINUED | OUTPATIENT
Start: 2022-06-28 | End: 2022-06-28 | Stop reason: HOSPADM

## 2022-06-28 RX ORDER — OXYTOCIN/0.9 % SODIUM CHLORIDE 30/500 ML
340 PLASTIC BAG, INJECTION (ML) INTRAVENOUS CONTINUOUS PRN
Status: DISCONTINUED | OUTPATIENT
Start: 2022-06-28 | End: 2022-06-28 | Stop reason: HOSPADM

## 2022-06-28 RX ORDER — NALOXONE HYDROCHLORIDE 0.4 MG/ML
0.2 INJECTION, SOLUTION INTRAMUSCULAR; INTRAVENOUS; SUBCUTANEOUS
Status: DISCONTINUED | OUTPATIENT
Start: 2022-06-28 | End: 2022-06-28 | Stop reason: HOSPADM

## 2022-06-28 RX ORDER — MISOPROSTOL 200 UG/1
800 TABLET ORAL
Status: DISCONTINUED | OUTPATIENT
Start: 2022-06-28 | End: 2022-06-29 | Stop reason: HOSPADM

## 2022-06-28 RX ORDER — OXYCODONE HYDROCHLORIDE 5 MG/1
5 TABLET ORAL EVERY 4 HOURS PRN
Status: DISCONTINUED | OUTPATIENT
Start: 2022-06-28 | End: 2022-06-28 | Stop reason: HOSPADM

## 2022-06-28 RX ORDER — ONDANSETRON 4 MG/1
4 TABLET, ORALLY DISINTEGRATING ORAL EVERY 6 HOURS PRN
Status: DISCONTINUED | OUTPATIENT
Start: 2022-06-28 | End: 2022-06-28 | Stop reason: HOSPADM

## 2022-06-28 RX ORDER — PRENATAL VIT/IRON FUM/FOLIC AC 27MG-0.8MG
1 TABLET ORAL DAILY
COMMUNITY

## 2022-06-28 RX ORDER — METOCLOPRAMIDE HYDROCHLORIDE 5 MG/ML
10 INJECTION INTRAMUSCULAR; INTRAVENOUS EVERY 6 HOURS PRN
Status: DISCONTINUED | OUTPATIENT
Start: 2022-06-28 | End: 2022-06-28 | Stop reason: HOSPADM

## 2022-06-28 RX ORDER — BISACODYL 10 MG
10 SUPPOSITORY, RECTAL RECTAL DAILY PRN
Status: DISCONTINUED | OUTPATIENT
Start: 2022-06-28 | End: 2022-06-29 | Stop reason: HOSPADM

## 2022-06-28 RX ORDER — CARBOPROST TROMETHAMINE 250 UG/ML
250 INJECTION, SOLUTION INTRAMUSCULAR
Status: DISCONTINUED | OUTPATIENT
Start: 2022-06-28 | End: 2022-06-29 | Stop reason: HOSPADM

## 2022-06-28 RX ORDER — METOCLOPRAMIDE 10 MG/1
10 TABLET ORAL EVERY 6 HOURS PRN
Status: DISCONTINUED | OUTPATIENT
Start: 2022-06-28 | End: 2022-06-28 | Stop reason: HOSPADM

## 2022-06-28 RX ORDER — MAGNESIUM HYDROXIDE/ALUMINUM HYDROXICE/SIMETHICONE 120; 1200; 1200 MG/30ML; MG/30ML; MG/30ML
30 SUSPENSION ORAL EVERY 4 HOURS PRN
Status: DISCONTINUED | OUTPATIENT
Start: 2022-06-28 | End: 2022-06-28 | Stop reason: HOSPADM

## 2022-06-28 RX ORDER — PROCHLORPERAZINE MALEATE 10 MG
10 TABLET ORAL EVERY 6 HOURS PRN
Status: DISCONTINUED | OUTPATIENT
Start: 2022-06-28 | End: 2022-06-28 | Stop reason: HOSPADM

## 2022-06-28 RX ORDER — LIDOCAINE 40 MG/G
CREAM TOPICAL
Status: DISCONTINUED | OUTPATIENT
Start: 2022-06-28 | End: 2022-06-28 | Stop reason: HOSPADM

## 2022-06-28 RX ORDER — FENTANYL CITRATE 50 UG/ML
100 INJECTION, SOLUTION INTRAMUSCULAR; INTRAVENOUS
Status: DISCONTINUED | OUTPATIENT
Start: 2022-06-28 | End: 2022-06-28 | Stop reason: HOSPADM

## 2022-06-28 RX ORDER — ACETAMINOPHEN 325 MG/1
650 TABLET ORAL EVERY 4 HOURS PRN
Status: DISCONTINUED | OUTPATIENT
Start: 2022-06-28 | End: 2022-06-29 | Stop reason: HOSPADM

## 2022-06-28 RX ADMIN — Medication 340 ML/HR: at 22:20

## 2022-06-28 RX ADMIN — FENTANYL CITRATE 100 MCG: 50 INJECTION, SOLUTION INTRAMUSCULAR; INTRAVENOUS at 21:14

## 2022-06-28 RX ADMIN — MISOPROSTOL 400 MCG: 200 TABLET ORAL at 17:40

## 2022-06-28 RX ADMIN — MISOPROSTOL 400 MCG: 200 TABLET ORAL at 14:22

## 2022-06-28 RX ADMIN — DIPHENOXYLATE HYDROCHLORIDE AND ATROPINE SULFATE 2 TABLET: 2.5; .025 TABLET ORAL at 18:50

## 2022-06-28 ASSESSMENT — ACTIVITIES OF DAILY LIVING (ADL)
WEAR_GLASSES_OR_BLIND: NO
ADLS_ACUITY_SCORE: 18
DOING_ERRANDS_INDEPENDENTLY_DIFFICULTY: NO
ADLS_ACUITY_SCORE: 18
DIFFICULTY_EATING/SWALLOWING: NO
FALL_HISTORY_WITHIN_LAST_SIX_MONTHS: NO
TOILETING_ISSUES: NO
CHANGE_IN_FUNCTIONAL_STATUS_SINCE_ONSET_OF_CURRENT_ILLNESS/INJURY: NO
ADLS_ACUITY_SCORE: 18
ADLS_ACUITY_SCORE: 18
HEARING_DIFFICULTY_OR_DEAF: NO
DRESSING/BATHING_DIFFICULTY: NO
DIFFICULTY_COMMUNICATING: NO
ADLS_ACUITY_SCORE: 18
WALKING_OR_CLIMBING_STAIRS_DIFFICULTY: NO
ADLS_ACUITY_SCORE: 18
CONCENTRATING,_REMEMBERING_OR_MAKING_DECISIONS_DIFFICULTY: NO

## 2022-06-28 NOTE — PLAN OF CARE
Problem:  Loss  Goal: Optimal Adjustment to Loss  Outcome: Ongoing, Progressing     Pregnancy and  loss booklet given to patient and spouse.

## 2022-06-28 NOTE — PLAN OF CARE
Following first dose of Cytotec patient samuel occasionally.  Patient states they feel like mild menstrual cramps.  Patient thinking may use IV pain meds when contractions more painful. Patient has connected with loss care coordinator.  Unsure of  home plans for baby at this time.  Halos also assisting patient and  with process.

## 2022-06-28 NOTE — H&P
"HISTORY AND PHYSICAL UPDATE ADMISSION EXAM    Name: Adina Galvez  YOB: 1990  Medical Record Number: [id number]    History of Present Illness: Adina Galvez is a 31 year old female who is 21.0w pregnant and being admitted for induction of labor, indication fetal demise. Adina presented for fetal anatomy survey ultrasound yesterday which revealed no fetal heart motion, she requested repeat ultrasound today which was done and confirmed yesterdays findings. Growth parameters on ultrasound ~16w gestation, 6 oz. She was counseled in the clinic on her options for proceeding with delivery and presented today at 11am for planned induction of labor. Her , Guicho, is present and supportive. They are grieving as expected. Of note, Guicho's brother also passed away last week.       Estimated Date of Delivery: 2022  EGA: 21w by dates, measuring 16w by ultrasound.    OB History    Para Term  AB Living   3 2 0 0 0 0   SAB IAB Ectopic Multiple Live Births   0 0 0 0 0      # Outcome Date GA Lbr [place]/2nd Weight Sex Delivery Anes PTL Lv   3 Para            2 Para            1                  No results found for: ABO, RH, AS, HEPBANG, CHPCRT, GCPCRT, TREPAB, RUBELLAABIGG, HGB, HIV    Prenatal Complications:  1. BMI >30  2. Hx anemia    Exam:      /73 (BP Location: Left arm, Patient Position: Sitting)   Temp 97.8  F (36.6  C) (Oral)   Resp 16   Ht 1.626 m (5' 4\")   Wt 86.6 kg (191 lb)   SpO2 98%   Breastfeeding Unknown   BMI 32.79 kg/m      Contractions None    Psych: intermittently tearful  HEENT grossly normal  Neck: no lymphadenopathy or thryoidomegaly  Lungs Unlabored breathing  ABD gravid, non-tender  EXT:  no edema, moves freely  Vaginal exam Deferrred  Membranes: intact    Assessment: induction of labor, indication fetal demise    Plan:   -Admit - see IP orders  -Labor induction with cytotec per protocol, she desires to start ASAP.   -Counseled on the IOL process and that " the initial process may be long but that once in labor herdelivery will likely be precipitous.   -We discussed her pain management desires- she is unsure how she would like to proceed- and if she does desire an epidural I recommend proceeding with that once she starts feeling regular contractions. Also discussed options of nitrous oxide, fentanyl.   -We discussed options of viewing/holding the baby, and she and her  will discuss and make their desires known to the care team.   -We discussed what to expect with delivery of placenta and that sometimes this process can take time and sometimes needs surgical intervention.   -We discussed options of external/internal autopsy, chromosome testing, and lab work. She is aware that often times despite all the testing we are unable to determine a cause of the loss. They have not made final decisions regarding autopsy or chromosome testing. Consents to standard blood work.   -Declines spiritual care consult.    Prenatal record reviewed.    Dr. Rodriguez aware of patient status and remains available for consultation and collaboration as needed.      CATHY Méndez CNM      6/28/2022   12:18 PM

## 2022-06-29 VITALS
DIASTOLIC BLOOD PRESSURE: 81 MMHG | RESPIRATION RATE: 16 BRPM | SYSTOLIC BLOOD PRESSURE: 126 MMHG | TEMPERATURE: 98.4 F | BODY MASS INDEX: 32.61 KG/M2 | HEIGHT: 64 IN | OXYGEN SATURATION: 99 % | WEIGHT: 191 LBS

## 2022-06-29 LAB
AT III ACT/NOR PPP CHRO: 100 % (ref 85–135)
B2 GLYCOPROT1 IGG SERPL IA-ACNC: 1.1 U/ML
B2 GLYCOPROT1 IGM SERPL IA-ACNC: <2.4 U/ML
CARDIOLIPIN IGG SER IA-ACNC: <2 GPL-U/ML
CARDIOLIPIN IGG SER IA-ACNC: NEGATIVE
CARDIOLIPIN IGM SER IA-ACNC: <2 MPL-U/ML
CARDIOLIPIN IGM SER IA-ACNC: NEGATIVE
FETAL RBC % LFV: 0 %
FETAL RBC (ML): 0 ML
IF INDICATED RECOMMENDED DOSE OF RH IMMUNE GLOBULIN UG: 300 UG

## 2022-06-29 PROCEDURE — 722N000001 HC LABOR CARE VAGINAL DELIVERY SINGLE

## 2022-06-29 PROCEDURE — C9803 HOPD COVID-19 SPEC COLLECT: HCPCS

## 2022-06-29 RX ORDER — ACETAMINOPHEN 325 MG/1
650 TABLET ORAL EVERY 4 HOURS PRN
COMMUNITY
Start: 2022-06-29

## 2022-06-29 RX ORDER — IBUPROFEN 800 MG/1
800 TABLET, FILM COATED ORAL EVERY 6 HOURS PRN
Status: ON HOLD | COMMUNITY
Start: 2022-06-29 | End: 2024-01-26

## 2022-06-29 ASSESSMENT — ACTIVITIES OF DAILY LIVING (ADL)
ADLS_ACUITY_SCORE: 18

## 2022-06-29 NOTE — L&D DELIVERY NOTE
"Vaginal Delivery Note    Name: Adina Galvez  : 1990  MRN: [id number]    PRE DELIVERY DIAGNOSIS  1) 31 year old  4 Para 2 at 21w0d      2) Fetal demise    POST DELIVERY DIAGNOSIS  1) 31 year old  @ 21w0d  2) Delivery of a nonviable infant, weight pending, via     YOB: 2021     Birth Time: 10:04 PM       Augmentation No  Induction Yes                      Indication: Fetal demise    Monitors: External- TOCO     GBS: Unknown    ROM: Baby born en caul  Fluid Type: Dark/thick    Labor Analgesia/Anesthesia:Fentanyl    Cord pH obtained: No  Placenta Date/Time: 2022 10:12 PM   Placenta submitted to Pathology: Yes    Description of procedure:   31 year old  with [facility] and pregnancy complicated by diagnosis of fetal demise yesterday who presented to L&D with plan for induction.  Her hospital course was uncomplicated.  Patient progressed into labor with 3 doses of 400mcg sublingual cytotec per protocol. Precipitously delivered baby boy, \"Flash Cano\", en caul in the bed. I arrived immediately after. With her consent, BOW was gently opened which revealed thick dark fluid. There was no nuchal cord. Cord was clamped, fetus placed on blanket, and infant was handed to mom. Placenta delivered shortly after with maternal pushing efforts. IV pitocin started immediately after placenta. Perineum intact.    Placenta thoroughly examined and appears to be small but intact. Central cord insertion, thin cord, no knots, no nuchal cord. Fetus appears small for gestational age, weight pending.      Delivery QBL (mL): 0      Jerome Holden, and Flash remain together in room at this time. Defer further examination of Flash to allow family time with Flash. They are declining autopsy and/or chromosome testing.      CATHY Méndez CNM    2022 10:44 PM  "

## 2022-06-29 NOTE — PLAN OF CARE
Problem:  Loss  Goal: Optimal Adjustment to Loss  Outcome: Ongoing, Progressing  Intervention: Promote Sense of Control, Involvement  Recent Flowsheet Documentation  Taken 2022 by Julianne Murillo RN  Loss/Grief Support:   opportunity to hold offered   /memorial plans discussed  Intervention: Support the Grieving Process  Recent Flowsheet Documentation  Taken 2022 by Julianne Murillo, RN  Family/Support System Care: support provided     VSS, bleeding scant; denies pain. Patient grieving as expected, withdrawn at times. Patient spent several hours with  and family holding baby. Chose to keep baby in saline bath overnight in room to help with preservation. Halos will take photos this AM.  at bedside and also grieving; support given to patient and  by RN.

## 2022-06-29 NOTE — PROGRESS NOTES
Patient and spouse grieving as expected. Resources discussed and giving. Talia performed photography of demise, pt was hands on. VSS. Would like to go home as soon as possible.

## 2022-06-29 NOTE — DISCHARGE SUMMARY
OB Discharge Summary      Date:  2022    Name:  Adina Galvez  :  1990  MRN:  [id number]      Admission Date:  2022  Delivery Date: 2022  Gestational Age at Delivery:  21w0d  Discharge Date:  2022    Principal Diagnosis:    Patient Active Problem List   Diagnosis     Fetal demise before 22 weeks with retention of dead fetus       Other Diagnosis:      Conditions complicating Pregnancy:  none    Procedure(s) Performed:  ; fetal demise    Indication for :    Indication for Induction:  Fetal demise     Condition at Discharge:  Stable, grieving    Discharge Medications:      Review of your medicines      START taking      Dose / Directions   acetaminophen 325 MG tablet  Commonly known as: TYLENOL      Dose: 650 mg  Take 2 tablets (650 mg) by mouth every 4 hours as needed for mild pain or fever (greater than or equal to 38  C /100.4  F (oral) or 38.5  C/ 101.4  F (core).)  Refills: 0     ibuprofen 800 MG tablet  Commonly known as: ADVIL/MOTRIN      Dose: 800 mg  Take 1 tablet (800 mg) by mouth every 6 hours as needed for other (cramping)  Refills: 0        CONTINUE these medicines which have NOT CHANGED      Dose / Directions   cetirizine 10 MG tablet  Commonly known as: zyrTEC      Dose: 10 mg  Take 10 mg by mouth daily  Refills: 0     prenatal multivitamin w/iron 27-0.8 MG tablet      Dose: 1 tablet  Take 1 tablet by mouth daily  Refills: 0           Where to get your medicines      Some of these will need a paper prescription and others can be bought over the counter. Ask your nurse if you have questions.    You don't need a prescription for these medications    acetaminophen 325 MG tablet    ibuprofen 800 MG tablet          Discharge Plan:    Follow up with /CELESTINE:  2 weeks and again at 6 weeks   Patient Instructions:      Physical activity: as tolerated    Diet:  regular    Medication:  PRN meds as needed    Other:        Physician/ABDIRIZAKM:  Carlton Murillo CNM    Name:   Adina Galvez  :  1990  MRN:  [id number]

## 2022-06-29 NOTE — PROGRESS NOTES
"Vaginal Delivery Postpartum Day 1    Patient Name:  Adina Galvez  :      1990  MRN:      [id number]      Assessment:  Normal postpartum course.    Plan:  Continue current care and discharge this morning. Discussed normal discharge instructions. Reviewed mood precautions and when to call, also discussed this with her  so he is aware of how to call on her behalf. Recommended f/u visit in 2 weeks, either video or in person. We will reach out when lab results are finalized.     Subjective:  The patient feels well:  Voiding without difficulty, lochia normal, tolerating normal diet, and passing flatus.  Pain is well controlled with current medications.  The patient is grieving appropriately with her . Halo  is present and taking photos now.     Objective:  /81 (BP Location: Left arm, Patient Position: Sitting, Cuff Size: Adult Regular)   Temp 98.4  F (36.9  C) (Oral)   Resp 16   Ht 1.626 m (5' 4\")   Wt 86.6 kg (191 lb)   SpO2 99%   Breastfeeding Unknown   BMI 32.79 kg/m    Patient Vitals for the past 24 hrs:   BP Temp Temp src Resp SpO2 Height Weight   22 0930 126/81 98.4  F (36.9  C) Oral 16 -- -- --   22 0406 109/58 -- -- -- -- -- --   22 0026 118/70 -- -- -- -- -- --   22 2358 111/65 98.1  F (36.7  C) Oral 16 -- -- --   22 2328 115/60 -- -- -- -- -- --   22 2324 (!) 148/81 -- -- -- -- -- --   22 2309 (!) 147/77 -- -- -- -- -- --   22 2254 (!) 140/82 -- -- -- -- -- --   22 2239 (!) 144/70 -- -- -- -- -- --   22 1941 122/68 -- -- -- -- -- --   22 1736 119/74 98.5  F (36.9  C) Oral -- -- -- --   22 1530 125/78 98.3  F (36.8  C) Oral 16 99 % -- --   22 1124 130/73 97.8  F (36.6  C) Oral 16 98 % 1.626 m (5' 4\") 86.6 kg (191 lb)       The amount and color of the lochia is appropriate for the duration of recovery.  The uterine fundus is firm.  Urinary output is adequate.     Lab Results   Component " Value Date    AS Negative 2022         There is no immunization history on file for this patient.    Provider:  Carlton Murillo CNM      Date:  2022  Time:  11:17 AM    Patient Name:  Adina Galvez  :  1990  MRN:  [id number]

## 2022-06-29 NOTE — PROGRESS NOTES
Placenta brought to lab; informed lab that cultures were not yet collected and to not add formalin until they were. Was told lab will collect cultures.

## 2022-06-29 NOTE — DISCHARGE INSTRUCTIONS
Postpartum Vaginal Delivery Instructions    Activity     Ask family and friends for help when you need it.  Do not place anything in your vagina for 6 weeks.  You are not restricted on other activities, but take it easy for a few weeks to allow your body to recover from delivery.  You are able to do any activities you feel up to that point.  No driving until you have stopped taking your pain medications (usually two weeks after delivery).     Call your health care provider if you have any of these symptoms:     Increased pain, swelling, redness, or fluid around your stiches from an episiotomy or perineal tear.  A fever above 100.4 F (38 C) with or without chills when placing a thermometer under your tongue.  You soak a sanitary pad with blood within 1 hour, or you see blood clots larger than a golf ball.  Bleeding that lasts more than 6 weeks.  Vaginal discharge that smells bad.  Severe pain, cramping or tenderness in your lower belly area.  A need to urinate more frequently (use the toilet more often), more urgently (use the toilet very quickly), or it burns when you urinate.  Nausea and vomiting.  Redness, swelling or pain around a vein in your leg.  Problems breastfeeding or a red or painful area on your breast.  Chest pain and cough or are gasping for air.  Problems coping with sadness, anxiety, or depression.  If you have any concerns about hurting yourself or the baby, call your provider immediately.   You have questions or concerns after you return home.     Keep your hands clean:  Always wash your hands before touching your perineal area and stitches.  This helps reduce your risk of infection.  If your hands aren't dirty, you may use an alcohol hand-rub to clean your hands. Keep your nails clean and short.      Pregnancy Loss: Grieving for Your Child  Losing a child through miscarriage or stillbirth is a painful experience. Grief is a normal reaction to this loss. You may not want to believe the loss is  real. You may feel numb, in shock, or angry. You may even think you could have somehow stopped the loss. At first, it may feel impossible to get through each day. But you will feel better with time, as you let yourself grieve.  Emotional and physical changes  Grief is complex. The grieving process varies from person to person. It can cause a lot of emotional and mental changes, such as:  Feeling sad, depressed, hopeless, or angry  Feeling worried, guilty, anxious, angry, or afraid  Trouble with memory and thinking  Not wanting to talk with or be around other people  Loss of interest in things you used to enjoy  Loss of interest in your life and work     Grief can also cause physical changes, such as:  Loss of appetite or overeating  Weight loss or gain  Tiredness  Trouble getting to sleep or staying asleep  Headaches, upset stomach, or hair loss  Sense of trouble breathing  Trembling or shakiness  Caring for yourself  While you are grieving, you need to take care of yourself. During this time:  Take care of your body. Eat a healthy diet and get physical activity. Get as much sleep as you can.  Don't use alcohol or drugs to numb your feelings. This can slow the grieving process.  Don't spend a lot of time alone. Have daily contact with family or friends. Talk about your loss with those closest to you.  Take time for yourself. Make a point to do things that you enjoy.  If you have been prescribed a medicine to help with your symptoms, take it only as directed. Do not use it with alcohol.  Try to avoid making major decisions.  Accepting support  You will need support while you are grieving. That support can come from family, friends, and neighbors. Your healthcare provider may refer you to a therapist or grief counselor. A grief support group can help. And this is the time many people reach out to their spiritual or Buddhist community. During this process:  Stay in touch with family and friends, even if it s hard to  talk.  Stick to a daily routine that keeps you connected to friends and family.  Tell people how they can help. It can be as simple as bringing you a meal or walking your dog.  Meet with your , , or rabbi, a counselor or therapist, or your own healthcare provider.  Consider joining a grief support group. Ask your healthcare provider how to find one in your area. Support groups can help you cope with your feelings and talk with others who have had a similar experience. These people can share what has helped them through their toughest moments. A support group can also help you with follow-up care. Grief can come back years later. It may be triggered by a memory or another pregnancy. Follow-up care can help you manage long-lasting effects of grief over months or years.  For other types of support, search online for  pregnancy loss support  to find resources like these:  Share: Pregnancy and Infant Loss Support, www.nationalshare.org  Pregnancy Loss Support Program, www.pregnancyloss.org  Saying goodbye  You may wish to have a memorial or  service for your child. This can also help family and friends understand the loss better. It can help give you and your family time to grieve together. Talk with the hospital staff if you wish to make  arrangements.  When to call the healthcare provider  There's no normal length of time to grieve. But if you feel unable to function, you may need extra help. Seeking help is not a sign of weakness. It means that you are taking charge of your recovery. Call your healthcare provider if you:  Can t eat or sleep for several days in a row  Have thoughts of harming yourself or others  Can t work or take care of yourself or others as needed  Feel out of control with extreme sadness, fear, or anger  Family or friends have asked you to get help  Gain or lose a lot of weight  Feel your grief is getting worse, or not getting any better  If you have thoughts of suicide or  of harming yourself, contact the National Suicide Prevention Lifeline at 250-536-3370, suicidepreventionlifeline.org/chat, or @835979GUBM.  Moving forward  At some point, you ll begin thinking about the future. You ll look ahead and make plans. To help yourself reach this point, try to do one thing each day to join in life. Keep at it, even if it feels strange at first. Your life may never be exactly the same. But one day you ll find you re living life fully again.  Jessie last reviewed this educational content on 7/1/2019 2000-2021 [employer]. All rights reserved. This information is not intended as a substitute for professional medical care. Always follow your healthcare professional's instructions.

## 2022-06-30 ENCOUNTER — TELEPHONE (OUTPATIENT)
Dept: OBGYN | Facility: CLINIC | Age: 32
End: 2022-06-30

## 2022-06-30 LAB
DRVVT SCREEN RATIO: 0.98
LA PPP-IMP: NEGATIVE
LUPUS INTERPRETATION: NORMAL
PATH REPORT.COMMENTS IMP SPEC: NORMAL
PATH REPORT.FINAL DX SPEC: NORMAL
PATH REPORT.GROSS SPEC: NORMAL
PATH REPORT.MICROSCOPIC SPEC OTHER STN: NORMAL
PATH REPORT.RELEVANT HX SPEC: NORMAL
PHOTO IMAGE: NORMAL
PTT RATIO: 1.03
THROMBIN TIME: 17 SECONDS (ref 13–19)

## 2022-06-30 PROCEDURE — 85390 FIBRINOLYSINS SCREEN I&R: CPT | Mod: 26 | Performed by: PATHOLOGY

## 2022-06-30 PROCEDURE — 88305 TISSUE EXAM BY PATHOLOGIST: CPT | Mod: 26 | Performed by: PATHOLOGY

## 2022-06-30 NOTE — TELEPHONE ENCOUNTER
I called Adina for follow up from her discontinue yesterday. She states she is just arriving to the  home. I provided her my call back number if she wanted to call back.

## 2022-07-01 LAB
B19V IGG SER IA-ACNC: 7.28 IV
B19V IGM SER IA-ACNC: 0.39 IV
BACTERIA PLACENTA AEROBE CULT: ABNORMAL
BACTERIA PLACENTA AEROBE CULT: ABNORMAL

## 2022-07-01 PROCEDURE — G0452 MOLECULAR PATHOLOGY INTERPR: HCPCS | Mod: 26 | Performed by: PATHOLOGY

## 2022-07-02 LAB — B19V DNA SER QL NAA+PROBE: NOT DETECTED

## 2022-07-24 ENCOUNTER — HEALTH MAINTENANCE LETTER (OUTPATIENT)
Age: 32
End: 2022-07-24

## 2022-10-03 ENCOUNTER — HEALTH MAINTENANCE LETTER (OUTPATIENT)
Age: 32
End: 2022-10-03

## 2022-12-20 ENCOUNTER — APPOINTMENT (OUTPATIENT)
Dept: URBAN - METROPOLITAN AREA CLINIC 260 | Age: 32
Setting detail: DERMATOLOGY
End: 2022-12-21

## 2022-12-20 VITALS — HEIGHT: 65 IN | WEIGHT: 197 LBS

## 2022-12-20 DIAGNOSIS — L82.1 OTHER SEBORRHEIC KERATOSIS: ICD-10-CM

## 2022-12-20 DIAGNOSIS — L70.0 ACNE VULGARIS: ICD-10-CM

## 2022-12-20 PROCEDURE — OTHER MIPS QUALITY: OTHER

## 2022-12-20 PROCEDURE — OTHER PRESCRIPTION MEDICATION MANAGEMENT: OTHER

## 2022-12-20 PROCEDURE — 99214 OFFICE O/P EST MOD 30 MIN: CPT

## 2022-12-20 PROCEDURE — OTHER PRESCRIPTION: OTHER

## 2022-12-20 PROCEDURE — OTHER COUNSELING: OTHER

## 2022-12-20 RX ORDER — AZELAIC ACID 0.15 G/G
GEL TOPICAL BID
Qty: 50 | Refills: 2 | Status: ERX | COMMUNITY
Start: 2022-12-20

## 2022-12-20 ASSESSMENT — LOCATION DETAILED DESCRIPTION DERM
LOCATION DETAILED: LEFT FOREHEAD
LOCATION DETAILED: LEFT MEDIAL MALAR CHEEK

## 2022-12-20 ASSESSMENT — LOCATION SIMPLE DESCRIPTION DERM
LOCATION SIMPLE: LEFT FOREHEAD
LOCATION SIMPLE: LEFT CHEEK

## 2022-12-20 ASSESSMENT — LOCATION ZONE DERM: LOCATION ZONE: FACE

## 2022-12-20 ASSESSMENT — SEVERITY ASSESSMENT OVERALL AMONG ALL PATIENTS
IN YOUR EXPERIENCE, AMONG ALL PATIENTS YOU HAVE SEEN WITH THIS CONDITION, HOW SEVERE IS THIS PATIENT'S CONDITION?: MULTIPLE INFLAMMATORY LESIONS BUT NONINFLAMMATORY LESIONS PREDOMINATE

## 2022-12-20 NOTE — HPI: SKIN LESION
Has Your Skin Lesion Been Treated?: not been treated
Is This A New Presentation, Or A Follow-Up?: Skin Lesion
Additional History: The patient discussed how she has recently changed to a more dense lotion to help with dryness around the area of concern.
Which Family Member (Optional)?: Grandma- paternal

## 2022-12-20 NOTE — HPI: ACNE (PATIENT REPORTED)
Where Is Your Acne Located?: Face and back
Additional Comments (Use Complete Sentences): The patient described current products as a Cerave cleanser, an AHA exfoliating scrub, and a moisturizer.  She is currently planning a pregnancy and wants treatments which are safe during pregnancy.

## 2022-12-20 NOTE — PROCEDURE: COUNSELING
Doxycycline Pregnancy And Lactation Text: This medication is Pregnancy Category D and not consider safe during pregnancy. It is also excreted in breast milk but is considered safe for shorter treatment courses.
Azelaic Acid Pregnancy And Lactation Text: This medication is considered safe during pregnancy and breast feeding.
High Dose Vitamin A Counseling: Side effects reviewed, pt to contact office should one occur.
Tetracycline Pregnancy And Lactation Text: This medication is Pregnancy Category D and not consider safe during pregnancy. It is also excreted in breast milk.
Patient Specific Counseling (Will Not Stick From Patient To Patient): Discussed liquid nitrogen as a potential treatment plan. At this time the patient decided not to proceed with liquid nitrogen treatment and will return to clinic for treatment if the area of concern becomes irritated or more concerning.
Erythromycin Counseling:  I discussed with the patient the risks of erythromycin including but not limited to GI upset, allergic reaction, drug rash, diarrhea, increase in liver enzymes, and yeast infections.
Patient Specific Counseling (Will Not Stick From Patient To Patient): Discussed various treatment plans which included starting an antibiotic or Spironolactone. Due to patient wanting to get pregnant, she decided to not move forward with these treatment plans and will reconsider at a later time.
Birth Control Pills Pregnancy And Lactation Text: This medication should be avoided if pregnant and for the first 30 days post-partum.
Tazorac Pregnancy And Lactation Text: This medication is not safe during pregnancy. It is unknown if this medication is excreted in breast milk.
Minocycline Counseling: Patient advised regarding possible photosensitivity and discoloration of the teeth, skin, lips, tongue and gums.  Patient instructed to avoid sunlight, if possible.  When exposed to sunlight, patients should wear protective clothing, sunglasses, and sunscreen.  The patient was instructed to call the office immediately if the following severe adverse effects occur:  hearing changes, easy bruising/bleeding, severe headache, or vision changes.  The patient verbalized understanding of the proper use and possible adverse effects of minocycline.  All of the patient's questions and concerns were addressed.
Winlevi Counseling:  I discussed with the patient the risks of topical clascoterone including but not limited to erythema, scaling, itching, and stinging. Patient voiced their understanding.
Aklief counseling:  Patient advised to apply a pea-sized amount only at bedtime and wait 30 minutes after washing their face before applying.  If too drying, patient may add a non-comedogenic moisturizer.  The most commonly reported side effects including irritation, redness, scaling, dryness, stinging, burning, itching, and increased risk of sunburn.  The patient verbalized understanding of the proper use and possible adverse effects of retinoids.  All of the patient's questions and concerns were addressed.
Benzoyl Peroxide Pregnancy And Lactation Text: This medication is Pregnancy Category C. It is unknown if benzoyl peroxide is excreted in breast milk.
Dapsone Counseling: I discussed with the patient the risks of dapsone including but not limited to hemolytic anemia, agranulocytosis, rashes, methemoglobinemia, kidney failure, peripheral neuropathy, headaches, GI upset, and liver toxicity.  Patients who start dapsone require monitoring including baseline LFTs and weekly CBCs for the first month, then every month thereafter.  The patient verbalized understanding of the proper use and possible adverse effects of dapsone.  All of the patient's questions and concerns were addressed.
Use Enhanced Medication Counseling?: No
Isotretinoin Counseling: Patient should get monthly blood tests, not donate blood, not drive at night if vision affected, not share medication, and not undergo elective surgery for 6 months after tx completed. Side effects reviewed, pt to contact office should one occur.
Topical Sulfur Applications Pregnancy And Lactation Text: This medication is Pregnancy Category C and has an unknown safety profile during pregnancy. It is unknown if this topical medication is excreted in breast milk.
Topical Retinoid Pregnancy And Lactation Text: This medication is Pregnancy Category C. It is unknown if this medication is excreted in breast milk.
Azithromycin Pregnancy And Lactation Text: This medication is considered safe during pregnancy and is also secreted in breast milk.
Azelaic Acid Counseling: Patient counseled that medicine may cause skin irritation and to avoid applying near the eyes.  In the event of skin irritation, the patient was advised to reduce the amount of the drug applied or use it less frequently.   The patient verbalized understanding of the proper use and possible adverse effects of azelaic acid.  All of the patient's questions and concerns were addressed.
Aklief Pregnancy And Lactation Text: It is unknown if this medication is safe to use during pregnancy.  It is unknown if this medication is excreted in breast milk.  Breastfeeding women should use the topical cream on the smallest area of the skin for the shortest time needed while breastfeeding.  Do not apply to nipple and areola.
Bactrim Counseling:  I discussed with the patient the risks of sulfa antibiotics including but not limited to GI upset, allergic reaction, drug rash, diarrhea, dizziness, photosensitivity, and yeast infections.  Rarely, more serious reactions can occur including but not limited to aplastic anemia, agranulocytosis, methemoglobinemia, blood dyscrasias, liver or kidney failure, lung infiltrates or desquamative/blistering drug rashes.
Dapsone Pregnancy And Lactation Text: This medication is Pregnancy Category C and is not considered safe during pregnancy or breast feeding.
Doxycycline Counseling:  Patient counseled regarding possible photosensitivity and increased risk for sunburn.  Patient instructed to avoid sunlight, if possible.  When exposed to sunlight, patients should wear protective clothing, sunglasses, and sunscreen.  The patient was instructed to call the office immediately if the following severe adverse effects occur:  hearing changes, easy bruising/bleeding, severe headache, or vision changes.  The patient verbalized understanding of the proper use and possible adverse effects of doxycycline.  All of the patient's questions and concerns were addressed.
Isotretinoin Pregnancy And Lactation Text: This medication is Pregnancy Category X and is considered extremely dangerous during pregnancy. It is unknown if it is excreted in breast milk.
Sarecycline Counseling: Patient advised regarding possible photosensitivity and discoloration of the teeth, skin, lips, tongue and gums.  Patient instructed to avoid sunlight, if possible.  When exposed to sunlight, patients should wear protective clothing, sunglasses, and sunscreen.  The patient was instructed to call the office immediately if the following severe adverse effects occur:  hearing changes, easy bruising/bleeding, severe headache, or vision changes.  The patient verbalized understanding of the proper use and possible adverse effects of sarecycline.  All of the patient's questions and concerns were addressed.
Spironolactone Counseling: Patient advised regarding risks of diarrhea, abdominal pain, hyperkalemia, birth defects (for female patients), liver toxicity and renal toxicity. The patient may need blood work to monitor liver and kidney function and potassium levels while on therapy. The patient verbalized understanding of the proper use and possible adverse effects of spironolactone.  All of the patient's questions and concerns were addressed.
Detail Level: Zone
Erythromycin Pregnancy And Lactation Text: This medication is Pregnancy Category B and is considered safe during pregnancy. It is also excreted in breast milk.
Tazorac Counseling:  Patient advised that medication is irritating and drying.  Patient may need to apply sparingly and wash off after an hour before eventually leaving it on overnight.  The patient verbalized understanding of the proper use and possible adverse effects of tazorac.  All of the patient's questions and concerns were addressed.
Benzoyl Peroxide Counseling: Patient counseled that medicine may cause skin irritation and bleach clothing.  In the event of skin irritation, the patient was advised to reduce the amount of the drug applied or use it less frequently.   The patient verbalized understanding of the proper use and possible adverse effects of benzoyl peroxide.  All of the patient's questions and concerns were addressed.
Tetracycline Counseling: Patient counseled regarding possible photosensitivity and increased risk for sunburn.  Patient instructed to avoid sunlight, if possible.  When exposed to sunlight, patients should wear protective clothing, sunglasses, and sunscreen.  The patient was instructed to call the office immediately if the following severe adverse effects occur:  hearing changes, easy bruising/bleeding, severe headache, or vision changes.  The patient verbalized understanding of the proper use and possible adverse effects of tetracycline.  All of the patient's questions and concerns were addressed. Patient understands to avoid pregnancy while on therapy due to potential birth defects.
Birth Control Pills Counseling: Birth Control Pill Counseling: I discussed with the patient the potential side effects of OCPs including but not limited to increased risk of stroke, heart attack, thrombophlebitis, deep venous thrombosis, hepatic adenomas, breast changes, GI upset, headaches, and depression.  The patient verbalized understanding of the proper use and possible adverse effects of OCPs. All of the patient's questions and concerns were addressed.
Topical Sulfur Applications Counseling: Topical Sulfur Counseling: Patient counseled that this medication may cause skin irritation or allergic reactions.  In the event of skin irritation, the patient was advised to reduce the amount of the drug applied or use it less frequently.   The patient verbalized understanding of the proper use and possible adverse effects of topical sulfur application.  All of the patient's questions and concerns were addressed.
Topical Retinoid counseling:  Patient advised to apply a pea-sized amount only at bedtime and wait 30 minutes after washing their face before applying.  If too drying, patient may add a non-comedogenic moisturizer. The patient verbalized understanding of the proper use and possible adverse effects of retinoids.  All of the patient's questions and concerns were addressed.
High Dose Vitamin A Pregnancy And Lactation Text: High dose vitamin A therapy is contraindicated during pregnancy and breast feeding.
Topical Clindamycin Pregnancy And Lactation Text: This medication is Pregnancy Category B and is considered safe during pregnancy. It is unknown if it is excreted in breast milk.
Topical Clindamycin Counseling: Patient counseled that this medication may cause skin irritation or allergic reactions.  In the event of skin irritation, the patient was advised to reduce the amount of the drug applied or use it less frequently.   The patient verbalized understanding of the proper use and possible adverse effects of clindamycin.  All of the patient's questions and concerns were addressed.
Detail Level: Detailed
Spironolactone Pregnancy And Lactation Text: This medication can cause feminization of the male fetus and should be avoided during pregnancy. The active metabolite is also found in breast milk.
Winlevi Pregnancy And Lactation Text: This medication is considered safe during pregnancy and breastfeeding.
Bactrim Pregnancy And Lactation Text: This medication is Pregnancy Category D and is known to cause fetal risk.  It is also excreted in breast milk.
Azithromycin Counseling:  I discussed with the patient the risks of azithromycin including but not limited to GI upset, allergic reaction, drug rash, diarrhea, and yeast infections.

## 2022-12-20 NOTE — PROCEDURE: PRESCRIPTION MEDICATION MANAGEMENT
Detail Level: Simple
Render In Strict Bullet Format?: No
Initiate Treatment: Azelaic acid 15% topical gel - apply a thin layer to affected areas BID

## 2023-02-12 ENCOUNTER — HEALTH MAINTENANCE LETTER (OUTPATIENT)
Age: 33
End: 2023-02-12

## 2023-06-24 ENCOUNTER — APPOINTMENT (OUTPATIENT)
Dept: ULTRASOUND IMAGING | Facility: CLINIC | Age: 33
End: 2023-06-24
Attending: EMERGENCY MEDICINE
Payer: COMMERCIAL

## 2023-06-24 ENCOUNTER — HOSPITAL ENCOUNTER (EMERGENCY)
Facility: CLINIC | Age: 33
Discharge: HOME | End: 2023-06-24
Attending: EMERGENCY MEDICINE | Admitting: EMERGENCY MEDICINE
Payer: COMMERCIAL

## 2023-06-24 VITALS
HEART RATE: 86 BPM | BODY MASS INDEX: 31.99 KG/M2 | HEIGHT: 65 IN | RESPIRATION RATE: 18 BRPM | OXYGEN SATURATION: 98 % | TEMPERATURE: 98 F | DIASTOLIC BLOOD PRESSURE: 94 MMHG | SYSTOLIC BLOOD PRESSURE: 139 MMHG | WEIGHT: 192 LBS

## 2023-06-24 DIAGNOSIS — O20.0 THREATENED ABORTION: ICD-10-CM

## 2023-06-24 LAB
ABO/RH(D): NORMAL
ALBUMIN SERPL-MCNC: 3.9 G/DL (ref 3.5–5)
ALBUMIN UR-MCNC: NEGATIVE MG/DL
ALP SERPL-CCNC: 56 U/L (ref 45–120)
ALT SERPL W P-5'-P-CCNC: 14 U/L (ref 0–45)
ANION GAP SERPL CALCULATED.3IONS-SCNC: 11 MMOL/L (ref 5–18)
ANTIBODY SCREEN: NEGATIVE
APPEARANCE UR: CLEAR
AST SERPL W P-5'-P-CCNC: 17 U/L (ref 0–40)
BACTERIA #/AREA URNS HPF: ABNORMAL /HPF
BASOPHILS # BLD AUTO: 0.1 10E3/UL (ref 0–0.2)
BASOPHILS NFR BLD AUTO: 1 %
BILIRUB DIRECT SERPL-MCNC: <0.1 MG/DL
BILIRUB SERPL-MCNC: <0.1 MG/DL (ref 0–1)
BILIRUB UR QL STRIP: NEGATIVE
BUN SERPL-MCNC: 12 MG/DL (ref 8–22)
CALCIUM SERPL-MCNC: 9.9 MG/DL (ref 8.5–10.5)
CHLORIDE BLD-SCNC: 107 MMOL/L (ref 98–107)
CLUE CELLS: ABNORMAL
CO2 SERPL-SCNC: 20 MMOL/L (ref 22–31)
COLOR UR AUTO: COLORLESS
CREAT SERPL-MCNC: 0.74 MG/DL (ref 0.6–1.1)
EOSINOPHIL # BLD AUTO: 0.2 10E3/UL (ref 0–0.7)
EOSINOPHIL NFR BLD AUTO: 2 %
ERYTHROCYTE [DISTWIDTH] IN BLOOD BY AUTOMATED COUNT: 14.1 % (ref 10–15)
GFR SERPL CREATININE-BSD FRML MDRD: >90 ML/MIN/1.73M2
GLUCOSE BLD-MCNC: 97 MG/DL (ref 70–125)
GLUCOSE UR STRIP-MCNC: NEGATIVE MG/DL
HCG SERPL-ACNC: ABNORMAL MLU/ML (ref 0–4)
HCT VFR BLD AUTO: 37.5 % (ref 35–47)
HGB BLD-MCNC: 12.6 G/DL (ref 11.7–15.7)
HGB UR QL STRIP: NEGATIVE
IMM GRANULOCYTES # BLD: 0 10E3/UL
IMM GRANULOCYTES NFR BLD: 0 %
KETONES UR STRIP-MCNC: NEGATIVE MG/DL
LEUKOCYTE ESTERASE UR QL STRIP: NEGATIVE
LYMPHOCYTES # BLD AUTO: 2.4 10E3/UL (ref 0.8–5.3)
LYMPHOCYTES NFR BLD AUTO: 23 %
MCH RBC QN AUTO: 26.9 PG (ref 26.5–33)
MCHC RBC AUTO-ENTMCNC: 33.6 G/DL (ref 31.5–36.5)
MCV RBC AUTO: 80 FL (ref 78–100)
MONOCYTES # BLD AUTO: 1.3 10E3/UL (ref 0–1.3)
MONOCYTES NFR BLD AUTO: 13 %
NEUTROPHILS # BLD AUTO: 6.5 10E3/UL (ref 1.6–8.3)
NEUTROPHILS NFR BLD AUTO: 61 %
NITRATE UR QL: NEGATIVE
NRBC # BLD AUTO: 0 10E3/UL
NRBC BLD AUTO-RTO: 0 /100
PH UR STRIP: 6.5 [PH] (ref 5–7)
PLATELET # BLD AUTO: 234 10E3/UL (ref 150–450)
POTASSIUM BLD-SCNC: 3.8 MMOL/L (ref 3.5–5)
PROT SERPL-MCNC: 7.1 G/DL (ref 6–8)
RBC # BLD AUTO: 4.69 10E6/UL (ref 3.8–5.2)
RBC URINE: 0 /HPF
SODIUM SERPL-SCNC: 138 MMOL/L (ref 136–145)
SP GR UR STRIP: 1.01 (ref 1–1.03)
SPECIMEN EXPIRATION DATE: NORMAL
SQUAMOUS EPITHELIAL: <1 /HPF
TRICHOMONAS, WET PREP: ABNORMAL
UROBILINOGEN UR STRIP-MCNC: <2 MG/DL
WBC # BLD AUTO: 10.6 10E3/UL (ref 4–11)
WBC URINE: 1 /HPF
WBC'S/HIGH POWER FIELD, WET PREP: ABNORMAL
YEAST, WET PREP: ABNORMAL

## 2023-06-24 PROCEDURE — 84702 CHORIONIC GONADOTROPIN TEST: CPT | Performed by: EMERGENCY MEDICINE

## 2023-06-24 PROCEDURE — 99284 EMERGENCY DEPT VISIT MOD MDM: CPT | Mod: 25

## 2023-06-24 PROCEDURE — 87210 SMEAR WET MOUNT SALINE/INK: CPT | Performed by: EMERGENCY MEDICINE

## 2023-06-24 PROCEDURE — 87591 N.GONORRHOEAE DNA AMP PROB: CPT | Performed by: EMERGENCY MEDICINE

## 2023-06-24 PROCEDURE — 86901 BLOOD TYPING SEROLOGIC RH(D): CPT | Performed by: EMERGENCY MEDICINE

## 2023-06-24 PROCEDURE — 85025 COMPLETE CBC W/AUTO DIFF WBC: CPT | Performed by: EMERGENCY MEDICINE

## 2023-06-24 PROCEDURE — 80053 COMPREHEN METABOLIC PANEL: CPT | Performed by: EMERGENCY MEDICINE

## 2023-06-24 PROCEDURE — 76801 OB US < 14 WKS SINGLE FETUS: CPT

## 2023-06-24 PROCEDURE — 82248 BILIRUBIN DIRECT: CPT | Performed by: EMERGENCY MEDICINE

## 2023-06-24 PROCEDURE — 87491 CHLMYD TRACH DNA AMP PROBE: CPT | Performed by: EMERGENCY MEDICINE

## 2023-06-24 PROCEDURE — 81001 URINALYSIS AUTO W/SCOPE: CPT | Performed by: EMERGENCY MEDICINE

## 2023-06-24 PROCEDURE — 36415 COLL VENOUS BLD VENIPUNCTURE: CPT | Performed by: EMERGENCY MEDICINE

## 2023-06-24 ASSESSMENT — ACTIVITIES OF DAILY LIVING (ADL): ADLS_ACUITY_SCORE: 33

## 2023-06-24 NOTE — Clinical Note
Adina Galvez was seen and treated in our emergency department on 6/24/2023.  She may return to work on 06/26/2023.       If you have any questions or concerns, please don't hesitate to call.      Precious Holloway MD

## 2023-06-24 NOTE — ED TRIAGE NOTES
Pt presents to the ED with c/o vaginal bleeding. Pt has had some spotting today. Pt noticed some brown discharge 5 days ago and then nothing since until today. Denies any cramping or back pain. Pt roughly 7 weeks pregnant.

## 2023-06-25 LAB
C TRACH DNA SPEC QL NAA+PROBE: NEGATIVE
N GONORRHOEA DNA SPEC QL NAA+PROBE: NEGATIVE

## 2023-06-25 NOTE — ED PROVIDER NOTES
EMERGENCY DEPARTMENT ENCOUNTER      NAME: Adina Galvez  AGE: 32 year old female  YOB: 1990  MRN: [id number]  EVALUATION DATE & TIME: 2023  7:00 PM    PCP: Abeba Diane    ED PROVIDER: Precious Holloway M.D.      Chief Complaint   Patient presents with     Vaginal Bleeding         FINAL IMPRESSION:  1. Threatened           ED COURSE & MEDICAL DECISION MAKING:    ED Course as of 23   Sat  Patient with 30 min episode pink small vaginal spotting without florid bleeding or clots or abdominal pain, vS WNL, pelvic examination with no blood right now present reassuringly, wet prep underway, patient to US shortly    Reassuringly wet prep with no trichomonas, yeast or clue cells and UA without cells to suggest UTI, T&S with A+ blood type and CMP and CBC WNL with normal VS    US with live IUP with  with 7w0d age, hcg consistent, will reassess patient now    Pt reassessed without recurrent bleedign and feeling well, amenable to plan to discharge with scheduled ob/gyn follow up this upcoming week. Patient discharged after being provided with extensive anticipatory guidance and given return precautions, importance of PMD follow-up emphasized.     7:20 PM  I met the patient and performed my initial interview and exam.      Pertinent Labs & Imaging studies reviewed. (See chart for details)    N95 worn  A face shield was worn also  COVID PPE    Medical Decision Making    History:    Supplemental history from: N/A    External Record(s) reviewed: Documented in chart, if applicable.    Work Up:    Chart documentation includes differential considered and any EKGs or imaging independently interpreted by provider, where specified.    In additional to work up documented, I considered the following work up: Documented in chart, if applicable.    External consultation:    Discussion of management with another provider: Documented in chart, if  applicable    Complicating factors:    Care impacted by chronic illness: N/A    Care affected by social determinants of health: N/A    Disposition considerations: Discharge. I recommended the patient continue their current prescription strength medication(s): prenatal vitamin. See documentation for any additional details.        At the conclusion of the encounter I discussed the results of all of the tests and the disposition. The questions were answered. The patient or family acknowledged understanding and was agreeable with the care plan.     MEDICATIONS GIVEN IN THE EMERGENCY:  Medications - No data to display    NEW PRESCRIPTIONS STARTED AT TODAY'S ER VISIT  New Prescriptions    No medications on file          =================================================================    HPI      Adina Galvez is a 32 year old female with PMHx of IUFD 1 year ago at 22 weeks gestation who presents to the ED today via walk in with vaginal bleeding.     The patient is pregnant at 7 weeks gestation and reports vaginal bleeding. She noticed brown discharge on 6/19 and then nothing until some discharge around 6 PM today (6/24). She denies abdominal cramps, back pain, fever, and dysuria. She has not had an ultrasound as of now.         REVIEW OF SYSTEMS   All other systems reviewed and are negative except as noted above in HPI.    PAST MEDICAL HISTORY:  History reviewed. No pertinent past medical history.    PAST SURGICAL HISTORY:  Past Surgical History:   Procedure Laterality Date     wisodom teeth Bilateral        CURRENT MEDICATIONS:    acetaminophen (TYLENOL) 325 MG tablet  cetirizine (ZYRTEC) 10 MG tablet  ibuprofen (ADVIL/MOTRIN) 800 MG tablet  Prenatal Vit-Fe Fumarate-FA (PRENATAL MULTIVITAMIN W/IRON) 27-0.8 MG tablet        ALLERGIES:  No Known Allergies    FAMILY HISTORY:  History reviewed. No pertinent family history.    SOCIAL HISTORY:   Social History     Socioeconomic History     Marital status:   "      VITALS:  Patient Vitals for the past 24 hrs:   BP Temp Temp src Pulse Resp SpO2 Height Weight   06/24/23 1857 (!) 139/94 98  F (36.7  C) Temporal 86 18 98 % 1.651 m (5' 5\") 87.1 kg (192 lb)       PHYSICAL EXAM    GENERAL: Awake, alert.  In no acute distress.   HEENT: Normocephalic, atraumatic.  Pupils equal, round and reactive.  Conjunctiva normal.  EOMI.  NECK: No stridor or apparent deformity.  PULMONARY: Symmetrical breath sounds without distress.  Lungs clear to auscultation bilaterally without wheezes, rhonchi or rales.  CARDIO: Regular rate and rhythm.  No significant murmur, rub or gallop.  Radial pulses strong and symmetrical.  ABDOMINAL: Abdomen soft, non-distended and non-tender to palpation.  No CVAT, no palpable hepatosplenomegaly.  EXTREMITIES: No lower extremity swelling or edema.    NEURO: Alert and oriented to person, place and time.  Cranial nerves grossly intact.  No focal motor deficit.  PSYCH: Normal mood and affect  SKIN: No rashes   PELVIC: Pelvic exam chaperoned by patient's RN, Nathan Malhotra. No vaginal blood. Closed cervical os. No CMT. No discharge.    LAB:  All pertinent labs reviewed and interpreted.  Results for orders placed or performed during the hospital encounter of 06/24/23   OB  US 1st trimester w transvag    Impression    IMPRESSION:   1.  Single living intrauterine gestation at 7 weeks 0 days, EDC 02/10/2024.       Comprehensive metabolic panel   Result Value Ref Range    Sodium 138 136 - 145 mmol/L    Potassium 3.8 3.5 - 5.0 mmol/L    Chloride 107 98 - 107 mmol/L    Carbon Dioxide (CO2) 20 (L) 22 - 31 mmol/L    Anion Gap 11 5 - 18 mmol/L    Urea Nitrogen 12 8 - 22 mg/dL    Creatinine 0.74 0.60 - 1.10 mg/dL    Calcium 9.9 8.5 - 10.5 mg/dL    Glucose 97 70 - 125 mg/dL    Alkaline Phosphatase 56 45 - 120 U/L    AST 17 0 - 40 U/L    ALT 14 0 - 45 U/L    Protein Total 7.1 6.0 - 8.0 g/dL    Albumin 3.9 3.5 - 5.0 g/dL    Bilirubin Total <0.1 0.0 - 1.0 mg/dL    GFR Estimate >90 >60 " mL/min/1.73m2   HCG quantitative pregnancy   Result Value Ref Range    hCG Quantitative 60,552 (H) 0 - 4 mlU/mL   UA with Microscopic reflex to Culture    Specimen: Urine, Clean Catch   Result Value Ref Range    Color Urine Colorless Colorless, Straw, Light Yellow, Yellow    Appearance Urine Clear Clear    Glucose Urine Negative Negative mg/dL    Bilirubin Urine Negative Negative    Ketones Urine Negative Negative mg/dL    Specific Gravity Urine 1.006 1.001 - 1.030    Blood Urine Negative Negative    pH Urine 6.5 5.0 - 7.0    Protein Albumin Urine Negative Negative mg/dL    Urobilinogen Urine <2.0 <2.0 mg/dL    Nitrite Urine Negative Negative    Leukocyte Esterase Urine Negative Negative    Bacteria Urine Few (A) None Seen /HPF    RBC Urine 0 <=2 /HPF    WBC Urine 1 <=5 /HPF    Squamous Epithelials Urine <1 <=1 /HPF   Result Value Ref Range    Bilirubin Direct <0.1 <=0.5 mg/dL   CBC with platelets and differential   Result Value Ref Range    WBC Count 10.6 4.0 - 11.0 10e3/uL    RBC Count 4.69 3.80 - 5.20 10e6/uL    Hemoglobin 12.6 11.7 - 15.7 g/dL    Hematocrit 37.5 35.0 - 47.0 %    MCV 80 78 - 100 fL    MCH 26.9 26.5 - 33.0 pg    MCHC 33.6 31.5 - 36.5 g/dL    RDW 14.1 10.0 - 15.0 %    Platelet Count 234 150 - 450 10e3/uL    % Neutrophils 61 %    % Lymphocytes 23 %    % Monocytes 13 %    % Eosinophils 2 %    % Basophils 1 %    % Immature Granulocytes 0 %    NRBCs per 100 WBC 0 <1 /100    Absolute Neutrophils 6.5 1.6 - 8.3 10e3/uL    Absolute Lymphocytes 2.4 0.8 - 5.3 10e3/uL    Absolute Monocytes 1.3 0.0 - 1.3 10e3/uL    Absolute Eosinophils 0.2 0.0 - 0.7 10e3/uL    Absolute Basophils 0.1 0.0 - 0.2 10e3/uL    Absolute Immature Granulocytes 0.0 <=0.4 10e3/uL    Absolute NRBCs 0.0 10e3/uL   Adult Type and Screen   Result Value Ref Range    ABO/RH(D) A POS     Antibody Screen Negative Negative    SPECIMEN EXPIRATION DATE [id number]    Wet prep    Specimen: Vagina; Swab   Result Value Ref Range    Trichomonas Absent  Absent    Yeast Absent Absent    Clue Cells Absent Absent    WBCs/high power field 3+ (A) None       RADIOLOGY:  Reviewed all pertinent imaging. Please see official radiology report.  OB  US 1st trimester w transvag   Final Result   IMPRESSION:    1.  Single living intrauterine gestation at 7 weeks 0 days, EDC 02/10/2024.                      I, Galindo Grey, am serving as a scribe to document services personally performed by Dr. Precious Holloway based on my observation and the provider's statements to me. I, Precious Holloway MD attest that Galindo Grey is acting in a scribe capacity, has observed my performance of the services and has documented them in accordance with my direction.     Precious Holloway MD  06/24/23 2040

## 2023-07-03 LAB
HEPATITIS B SURFACE ANTIGEN (EXTERNAL): NONREACTIVE
HIV1+2 AB SERPL QL IA: NONREACTIVE
RUBELLA ANTIBODY IGG (EXTERNAL): NORMAL

## 2023-11-21 LAB — TREPONEMA PALLIDUM ANTIBODY (EXTERNAL): NONREACTIVE

## 2024-01-19 LAB — GROUP B STREPTOCOCCUS (EXTERNAL): POSITIVE

## 2024-01-26 ENCOUNTER — HOSPITAL ENCOUNTER (OUTPATIENT)
Facility: CLINIC | Age: 34
Discharge: HOME | End: 2024-01-26
Attending: OBSTETRICS & GYNECOLOGY | Admitting: OBSTETRICS & GYNECOLOGY
Payer: COMMERCIAL

## 2024-01-26 VITALS — HEART RATE: 86 BPM | DIASTOLIC BLOOD PRESSURE: 91 MMHG | SYSTOLIC BLOOD PRESSURE: 127 MMHG | RESPIRATION RATE: 18 BRPM

## 2024-01-26 PROBLEM — O32.9XX0 FAILED EXTERNAL CEPHALIC VERSION: Status: RESOLVED | Noted: 2024-01-26 | Resolved: 2024-01-26

## 2024-01-26 PROBLEM — O32.9XX0 FAILED EXTERNAL CEPHALIC VERSION: Status: ACTIVE | Noted: 2024-01-26

## 2024-01-26 PROCEDURE — 59412 ANTEPARTUM MANIPULATION: CPT

## 2024-01-26 PROCEDURE — 96372 THER/PROPH/DIAG INJ SC/IM: CPT | Performed by: OBSTETRICS & GYNECOLOGY

## 2024-01-26 PROCEDURE — 250N000011 HC RX IP 250 OP 636: Performed by: OBSTETRICS & GYNECOLOGY

## 2024-01-26 RX ORDER — TERBUTALINE SULFATE 1 MG/ML
0.25 INJECTION, SOLUTION SUBCUTANEOUS ONCE
Status: COMPLETED | OUTPATIENT
Start: 2024-01-26 | End: 2024-01-26

## 2024-01-26 RX ORDER — LIDOCAINE 40 MG/G
CREAM TOPICAL
Status: DISCONTINUED | OUTPATIENT
Start: 2024-01-26 | End: 2024-01-26 | Stop reason: HOSPADM

## 2024-01-26 RX ADMIN — TERBUTALINE SULFATE 0.25 MG: 1 INJECTION, SOLUTION SUBCUTANEOUS at 13:05

## 2024-01-26 ASSESSMENT — ACTIVITIES OF DAILY LIVING (ADL): ADLS_ACUITY_SCORE: 35

## 2024-01-26 NOTE — PROCEDURES
ECV PROCEDURE NOTE    PRE PROCEDURE DIAGNOSIS  1) Intrauterine pregnancy at 37w5d Gestational age  2) Breech presentation      POST PROCEDURE DIAGNOSIS  1) Intrauterine pregnancy at 37w5d Gestational age  2) successful ECV    PROBLEM LIST:  Patient Active Problem List   Diagnosis    Fetal demise before 22 weeks with retention of dead fetus    Failed external cephalic version       LABS:  A positive     PROVIDER:   Daphne Doyle MD    [facility]       DESCRIPTION OF PROCEDURE  Adina Galvez is a 33 year old  at 37w5d.  Patient was noted to be in persistent breech presentation since 32wks and was scheduled for an external cephalic version.  The patient was placed on the monitor and was noted to have a reactive NST.  A bedside ultrasound was performed and the fetus was found to be in breech presentation with back on maternal left.    Risks and benefits were discussed with patient, including but not limited to failed ECV, fetal bradycardia or non-reassuring FHT necessitating an emergent  section.  Questions answered.  Consent obtained.    The patient received 0.25mg terbutaline SQ.  The ECV was then performed with forward roll of the fetus and was successful on first attempt.  Ultrasound was performed after the ECV and the infant was noted to be in vertex presentation with back on maternal right.    A NST was performed following the procedure and was reactive    POSTPROCEDURE PLAN:  Has follow up US and clinic visit early next week  Anticipate IOL at 39wks (history of second trimester loss)       Daphne Doyle MD

## 2024-01-26 NOTE — PROGRESS NOTES
Pt presented for scheduled version today with Dr Doyle.  Version successfully completed.  Post ECV NST is reactive and pt is not feeling any pain or contractions.  Pt and RN reviewed discharge instructions, and pt discharged to home.     Orly Ventura RN

## 2024-02-05 ENCOUNTER — ANESTHESIA EVENT (OUTPATIENT)
Dept: OBGYN | Facility: CLINIC | Age: 34
End: 2024-02-05
Payer: COMMERCIAL

## 2024-02-05 ENCOUNTER — HOSPITAL ENCOUNTER (INPATIENT)
Facility: CLINIC | Age: 34
LOS: 1 days | Discharge: HOME | End: 2024-02-06
Attending: ADVANCED PRACTICE MIDWIFE | Admitting: ADVANCED PRACTICE MIDWIFE
Payer: COMMERCIAL

## 2024-02-05 ENCOUNTER — ANESTHESIA (OUTPATIENT)
Dept: OBGYN | Facility: CLINIC | Age: 34
End: 2024-02-05
Payer: COMMERCIAL

## 2024-02-05 DIAGNOSIS — J06.9 VIRAL UPPER RESPIRATORY TRACT INFECTION: ICD-10-CM

## 2024-02-05 DIAGNOSIS — D62 ANEMIA DUE TO BLOOD LOSS, ACUTE: ICD-10-CM

## 2024-02-05 PROBLEM — Z34.90 ENCOUNTER FOR INDUCTION OF LABOR: Status: ACTIVE | Noted: 2024-02-05

## 2024-02-05 LAB
ABO/RH(D): NORMAL
ANTIBODY SCREEN: NEGATIVE
ERYTHROCYTE [DISTWIDTH] IN BLOOD BY AUTOMATED COUNT: 14.3 % (ref 10–15)
HCT VFR BLD AUTO: 36.4 % (ref 35–47)
HGB BLD-MCNC: 12.3 G/DL (ref 11.7–15.7)
MCH RBC QN AUTO: 27.5 PG (ref 26.5–33)
MCHC RBC AUTO-ENTMCNC: 33.8 G/DL (ref 31.5–36.5)
MCV RBC AUTO: 81 FL (ref 78–100)
PLATELET # BLD AUTO: 190 10E3/UL (ref 150–450)
RBC # BLD AUTO: 4.48 10E6/UL (ref 3.8–5.2)
RUPTURE OF FETAL MEMBRANES BY ROM PLUS: POSITIVE
SPECIMEN EXPIRATION DATE: NORMAL
T PALLIDUM AB SER QL: NONREACTIVE
WBC # BLD AUTO: 11.5 10E3/UL (ref 4–11)

## 2024-02-05 PROCEDURE — 86780 TREPONEMA PALLIDUM: CPT | Performed by: ADVANCED PRACTICE MIDWIFE

## 2024-02-05 PROCEDURE — 85014 HEMATOCRIT: CPT | Performed by: ADVANCED PRACTICE MIDWIFE

## 2024-02-05 PROCEDURE — 86900 BLOOD TYPING SEROLOGIC ABO: CPT | Performed by: ADVANCED PRACTICE MIDWIFE

## 2024-02-05 PROCEDURE — 370N000003 HC ANESTHESIA WARD SERVICE: Performed by: ANESTHESIOLOGY

## 2024-02-05 PROCEDURE — 84112 EVAL AMNIOTIC FLUID PROTEIN: CPT | Performed by: ADVANCED PRACTICE MIDWIFE

## 2024-02-05 PROCEDURE — 250N000009 HC RX 250: Performed by: ADVANCED PRACTICE MIDWIFE

## 2024-02-05 PROCEDURE — 250N000013 HC RX MED GY IP 250 OP 250 PS 637: Performed by: ADVANCED PRACTICE MIDWIFE

## 2024-02-05 PROCEDURE — 00HU33Z INSERTION OF INFUSION DEVICE INTO SPINAL CANAL, PERCUTANEOUS APPROACH: ICD-10-PCS | Performed by: ANESTHESIOLOGY

## 2024-02-05 PROCEDURE — 3E0R3BZ INTRODUCTION OF ANESTHETIC AGENT INTO SPINAL CANAL, PERCUTANEOUS APPROACH: ICD-10-PCS | Performed by: ANESTHESIOLOGY

## 2024-02-05 PROCEDURE — 0HQ9XZZ REPAIR PERINEUM SKIN, EXTERNAL APPROACH: ICD-10-PCS | Performed by: ADVANCED PRACTICE MIDWIFE

## 2024-02-05 PROCEDURE — 250N000011 HC RX IP 250 OP 636: Performed by: ANESTHESIOLOGY

## 2024-02-05 PROCEDURE — 3E033VJ INTRODUCTION OF OTHER HORMONE INTO PERIPHERAL VEIN, PERCUTANEOUS APPROACH: ICD-10-PCS | Performed by: ADVANCED PRACTICE MIDWIFE

## 2024-02-05 PROCEDURE — 36415 COLL VENOUS BLD VENIPUNCTURE: CPT | Performed by: ADVANCED PRACTICE MIDWIFE

## 2024-02-05 PROCEDURE — 722N000001 HC LABOR CARE VAGINAL DELIVERY SINGLE

## 2024-02-05 PROCEDURE — 258N000003 HC RX IP 258 OP 636: Performed by: ADVANCED PRACTICE MIDWIFE

## 2024-02-05 PROCEDURE — 120N000001 HC R&B MED SURG/OB

## 2024-02-05 PROCEDURE — 250N000011 HC RX IP 250 OP 636: Mod: JZ | Performed by: ADVANCED PRACTICE MIDWIFE

## 2024-02-05 RX ORDER — TRANEXAMIC ACID 10 MG/ML
1 INJECTION, SOLUTION INTRAVENOUS EVERY 30 MIN PRN
Status: DISCONTINUED | OUTPATIENT
Start: 2024-02-05 | End: 2024-02-07 | Stop reason: HOSPADM

## 2024-02-05 RX ORDER — KETOROLAC TROMETHAMINE 30 MG/ML
30 INJECTION, SOLUTION INTRAMUSCULAR; INTRAVENOUS
Status: DISCONTINUED | OUTPATIENT
Start: 2024-02-05 | End: 2024-02-05 | Stop reason: ALTCHOICE

## 2024-02-05 RX ORDER — METHYLERGONOVINE MALEATE 0.2 MG/ML
200 INJECTION INTRAVENOUS
Status: DISCONTINUED | OUTPATIENT
Start: 2024-02-05 | End: 2024-02-07 | Stop reason: HOSPADM

## 2024-02-05 RX ORDER — NALOXONE HYDROCHLORIDE 0.4 MG/ML
0.2 INJECTION, SOLUTION INTRAMUSCULAR; INTRAVENOUS; SUBCUTANEOUS
Status: DISCONTINUED | OUTPATIENT
Start: 2024-02-05 | End: 2024-02-05 | Stop reason: HOSPADM

## 2024-02-05 RX ORDER — LOPERAMIDE HCL 2 MG
2 CAPSULE ORAL
Status: DISCONTINUED | OUTPATIENT
Start: 2024-02-05 | End: 2024-02-05 | Stop reason: HOSPADM

## 2024-02-05 RX ORDER — METHYLERGONOVINE MALEATE 0.2 MG/ML
200 INJECTION INTRAVENOUS
Status: DISCONTINUED | OUTPATIENT
Start: 2024-02-05 | End: 2024-02-05 | Stop reason: HOSPADM

## 2024-02-05 RX ORDER — CITRIC ACID/SODIUM CITRATE 334-500MG
30 SOLUTION, ORAL ORAL
Status: DISCONTINUED | OUTPATIENT
Start: 2024-02-05 | End: 2024-02-05 | Stop reason: HOSPADM

## 2024-02-05 RX ORDER — MODIFIED LANOLIN
OINTMENT (GRAM) TOPICAL
Status: DISCONTINUED | OUTPATIENT
Start: 2024-02-05 | End: 2024-02-07 | Stop reason: HOSPADM

## 2024-02-05 RX ORDER — METOCLOPRAMIDE 10 MG/1
10 TABLET ORAL EVERY 6 HOURS PRN
Status: DISCONTINUED | OUTPATIENT
Start: 2024-02-05 | End: 2024-02-05 | Stop reason: HOSPADM

## 2024-02-05 RX ORDER — FENTANYL CITRATE-0.9 % NACL/PF 10 MCG/ML
100 PLASTIC BAG, INJECTION (ML) INTRAVENOUS EVERY 5 MIN PRN
Status: DISCONTINUED | OUTPATIENT
Start: 2024-02-05 | End: 2024-02-05 | Stop reason: HOSPADM

## 2024-02-05 RX ORDER — NALOXONE HYDROCHLORIDE 0.4 MG/ML
0.4 INJECTION, SOLUTION INTRAMUSCULAR; INTRAVENOUS; SUBCUTANEOUS
Status: DISCONTINUED | OUTPATIENT
Start: 2024-02-05 | End: 2024-02-05 | Stop reason: HOSPADM

## 2024-02-05 RX ORDER — ACETAMINOPHEN 325 MG/1
650 TABLET ORAL EVERY 4 HOURS PRN
Status: DISCONTINUED | OUTPATIENT
Start: 2024-02-05 | End: 2024-02-07 | Stop reason: HOSPADM

## 2024-02-05 RX ORDER — OXYTOCIN 10 [USP'U]/ML
10 INJECTION, SOLUTION INTRAMUSCULAR; INTRAVENOUS
Status: DISCONTINUED | OUTPATIENT
Start: 2024-02-05 | End: 2024-02-07 | Stop reason: HOSPADM

## 2024-02-05 RX ORDER — OXYTOCIN 10 [USP'U]/ML
10 INJECTION, SOLUTION INTRAMUSCULAR; INTRAVENOUS
Status: DISCONTINUED | OUTPATIENT
Start: 2024-02-05 | End: 2024-02-05 | Stop reason: HOSPADM

## 2024-02-05 RX ORDER — PROCHLORPERAZINE MALEATE 10 MG
10 TABLET ORAL EVERY 6 HOURS PRN
Status: DISCONTINUED | OUTPATIENT
Start: 2024-02-05 | End: 2024-02-05 | Stop reason: HOSPADM

## 2024-02-05 RX ORDER — FENTANYL CITRATE 50 UG/ML
100 INJECTION, SOLUTION INTRAMUSCULAR; INTRAVENOUS
Status: DISCONTINUED | OUTPATIENT
Start: 2024-02-05 | End: 2024-02-05 | Stop reason: HOSPADM

## 2024-02-05 RX ORDER — METOCLOPRAMIDE HYDROCHLORIDE 5 MG/ML
10 INJECTION INTRAMUSCULAR; INTRAVENOUS EVERY 6 HOURS PRN
Status: DISCONTINUED | OUTPATIENT
Start: 2024-02-05 | End: 2024-02-05 | Stop reason: HOSPADM

## 2024-02-05 RX ORDER — MISOPROSTOL 200 UG/1
800 TABLET ORAL
Status: DISCONTINUED | OUTPATIENT
Start: 2024-02-05 | End: 2024-02-05 | Stop reason: HOSPADM

## 2024-02-05 RX ORDER — LIDOCAINE 40 MG/G
CREAM TOPICAL
Status: DISCONTINUED | OUTPATIENT
Start: 2024-02-05 | End: 2024-02-05 | Stop reason: HOSPADM

## 2024-02-05 RX ORDER — PROCHLORPERAZINE 25 MG
25 SUPPOSITORY, RECTAL RECTAL EVERY 12 HOURS PRN
Status: DISCONTINUED | OUTPATIENT
Start: 2024-02-05 | End: 2024-02-05 | Stop reason: HOSPADM

## 2024-02-05 RX ORDER — DOCUSATE SODIUM 100 MG/1
100 CAPSULE, LIQUID FILLED ORAL DAILY
Status: DISCONTINUED | OUTPATIENT
Start: 2024-02-06 | End: 2024-02-07 | Stop reason: HOSPADM

## 2024-02-05 RX ORDER — OXYTOCIN/0.9 % SODIUM CHLORIDE 30/500 ML
1-24 PLASTIC BAG, INJECTION (ML) INTRAVENOUS CONTINUOUS
Status: DISCONTINUED | OUTPATIENT
Start: 2024-02-05 | End: 2024-02-05 | Stop reason: HOSPADM

## 2024-02-05 RX ORDER — HYDROCORTISONE 25 MG/G
CREAM TOPICAL 3 TIMES DAILY PRN
Status: DISCONTINUED | OUTPATIENT
Start: 2024-02-05 | End: 2024-02-07 | Stop reason: HOSPADM

## 2024-02-05 RX ORDER — LOPERAMIDE HCL 2 MG
4 CAPSULE ORAL
Status: DISCONTINUED | OUTPATIENT
Start: 2024-02-05 | End: 2024-02-07 | Stop reason: HOSPADM

## 2024-02-05 RX ORDER — ONDANSETRON 4 MG/1
4 TABLET, ORALLY DISINTEGRATING ORAL EVERY 6 HOURS PRN
Status: DISCONTINUED | OUTPATIENT
Start: 2024-02-05 | End: 2024-02-05 | Stop reason: HOSPADM

## 2024-02-05 RX ORDER — ONDANSETRON 2 MG/ML
4 INJECTION INTRAMUSCULAR; INTRAVENOUS EVERY 6 HOURS PRN
Status: DISCONTINUED | OUTPATIENT
Start: 2024-02-05 | End: 2024-02-05

## 2024-02-05 RX ORDER — MISOPROSTOL 200 UG/1
400 TABLET ORAL
Status: DISCONTINUED | OUTPATIENT
Start: 2024-02-05 | End: 2024-02-05 | Stop reason: HOSPADM

## 2024-02-05 RX ORDER — MISOPROSTOL 200 UG/1
400 TABLET ORAL
Status: DISCONTINUED | OUTPATIENT
Start: 2024-02-05 | End: 2024-02-07 | Stop reason: HOSPADM

## 2024-02-05 RX ORDER — MISOPROSTOL 200 UG/1
800 TABLET ORAL
Status: DISCONTINUED | OUTPATIENT
Start: 2024-02-05 | End: 2024-02-07 | Stop reason: HOSPADM

## 2024-02-05 RX ORDER — TERBUTALINE SULFATE 1 MG/ML
0.25 INJECTION, SOLUTION SUBCUTANEOUS
Status: DISCONTINUED | OUTPATIENT
Start: 2024-02-05 | End: 2024-02-05 | Stop reason: HOSPADM

## 2024-02-05 RX ORDER — PENICILLIN G 3000000 [IU]/50ML
3 INJECTION, SOLUTION INTRAVENOUS EVERY 4 HOURS
Status: DISCONTINUED | OUTPATIENT
Start: 2024-02-05 | End: 2024-02-06

## 2024-02-05 RX ORDER — IBUPROFEN 800 MG/1
800 TABLET, FILM COATED ORAL EVERY 6 HOURS PRN
Status: DISCONTINUED | OUTPATIENT
Start: 2024-02-05 | End: 2024-02-07 | Stop reason: HOSPADM

## 2024-02-05 RX ORDER — ONDANSETRON 2 MG/ML
4 INJECTION INTRAMUSCULAR; INTRAVENOUS EVERY 6 HOURS PRN
Status: DISCONTINUED | OUTPATIENT
Start: 2024-02-05 | End: 2024-02-05 | Stop reason: HOSPADM

## 2024-02-05 RX ORDER — FENTANYL/ROPIVACAINE/NS/PF 2MCG/ML-.1
PLASTIC BAG, INJECTION (ML) EPIDURAL
Status: DISCONTINUED | OUTPATIENT
Start: 2024-02-05 | End: 2024-02-05 | Stop reason: HOSPADM

## 2024-02-05 RX ORDER — OXYTOCIN/0.9 % SODIUM CHLORIDE 30/500 ML
340 PLASTIC BAG, INJECTION (ML) INTRAVENOUS CONTINUOUS PRN
Status: DISCONTINUED | OUTPATIENT
Start: 2024-02-05 | End: 2024-02-07 | Stop reason: HOSPADM

## 2024-02-05 RX ORDER — OXYTOCIN/0.9 % SODIUM CHLORIDE 30/500 ML
100-340 PLASTIC BAG, INJECTION (ML) INTRAVENOUS CONTINUOUS PRN
Status: DISCONTINUED | OUTPATIENT
Start: 2024-02-05 | End: 2024-02-07 | Stop reason: HOSPADM

## 2024-02-05 RX ORDER — CARBOPROST TROMETHAMINE 250 UG/ML
250 INJECTION, SOLUTION INTRAMUSCULAR
Status: DISCONTINUED | OUTPATIENT
Start: 2024-02-05 | End: 2024-02-05 | Stop reason: HOSPADM

## 2024-02-05 RX ORDER — SODIUM CHLORIDE, SODIUM LACTATE, POTASSIUM CHLORIDE, CALCIUM CHLORIDE 600; 310; 30; 20 MG/100ML; MG/100ML; MG/100ML; MG/100ML
INJECTION, SOLUTION INTRAVENOUS CONTINUOUS PRN
Status: DISCONTINUED | OUTPATIENT
Start: 2024-02-05 | End: 2024-02-05 | Stop reason: HOSPADM

## 2024-02-05 RX ORDER — LOPERAMIDE HCL 2 MG
4 CAPSULE ORAL
Status: DISCONTINUED | OUTPATIENT
Start: 2024-02-05 | End: 2024-02-05 | Stop reason: HOSPADM

## 2024-02-05 RX ORDER — IBUPROFEN 800 MG/1
800 TABLET, FILM COATED ORAL
Status: DISCONTINUED | OUTPATIENT
Start: 2024-02-05 | End: 2024-02-05 | Stop reason: ALTCHOICE

## 2024-02-05 RX ORDER — TRANEXAMIC ACID 10 MG/ML
1 INJECTION, SOLUTION INTRAVENOUS EVERY 30 MIN PRN
Status: DISCONTINUED | OUTPATIENT
Start: 2024-02-05 | End: 2024-02-05 | Stop reason: HOSPADM

## 2024-02-05 RX ORDER — CARBOPROST TROMETHAMINE 250 UG/ML
250 INJECTION, SOLUTION INTRAMUSCULAR
Status: DISCONTINUED | OUTPATIENT
Start: 2024-02-05 | End: 2024-02-07 | Stop reason: HOSPADM

## 2024-02-05 RX ORDER — LOPERAMIDE HCL 2 MG
2 CAPSULE ORAL
Status: DISCONTINUED | OUTPATIENT
Start: 2024-02-05 | End: 2024-02-07 | Stop reason: HOSPADM

## 2024-02-05 RX ORDER — OXYTOCIN/0.9 % SODIUM CHLORIDE 30/500 ML
340 PLASTIC BAG, INJECTION (ML) INTRAVENOUS CONTINUOUS PRN
Status: DISCONTINUED | OUTPATIENT
Start: 2024-02-05 | End: 2024-02-05 | Stop reason: HOSPADM

## 2024-02-05 RX ORDER — NALBUPHINE HYDROCHLORIDE 20 MG/ML
2.5-5 INJECTION, SOLUTION INTRAMUSCULAR; INTRAVENOUS; SUBCUTANEOUS EVERY 6 HOURS PRN
Status: DISCONTINUED | OUTPATIENT
Start: 2024-02-05 | End: 2024-02-07 | Stop reason: HOSPADM

## 2024-02-05 RX ORDER — ONDANSETRON 4 MG/1
4 TABLET, ORALLY DISINTEGRATING ORAL EVERY 6 HOURS PRN
Status: DISCONTINUED | OUTPATIENT
Start: 2024-02-05 | End: 2024-02-05

## 2024-02-05 RX ORDER — BISACODYL 10 MG
10 SUPPOSITORY, RECTAL RECTAL DAILY PRN
Status: DISCONTINUED | OUTPATIENT
Start: 2024-02-05 | End: 2024-02-07 | Stop reason: HOSPADM

## 2024-02-05 RX ADMIN — PENICILLIN G 3 MILLION UNITS: 3000000 INJECTION, SOLUTION INTRAVENOUS at 14:17

## 2024-02-05 RX ADMIN — Medication 2 MILLI-UNITS/MIN: at 10:34

## 2024-02-05 RX ADMIN — SODIUM CHLORIDE, POTASSIUM CHLORIDE, SODIUM LACTATE AND CALCIUM CHLORIDE 125 ML/HR: 600; 310; 30; 20 INJECTION, SOLUTION INTRAVENOUS at 09:53

## 2024-02-05 RX ADMIN — SODIUM CHLORIDE 5 MILLION UNITS: 9 INJECTION, SOLUTION INTRAVENOUS at 09:54

## 2024-02-05 RX ADMIN — PENICILLIN G 3 MILLION UNITS: 3000000 INJECTION, SOLUTION INTRAVENOUS at 19:35

## 2024-02-05 RX ADMIN — IBUPROFEN 800 MG: 800 TABLET ORAL at 23:27

## 2024-02-05 RX ADMIN — LIDOCAINE HYDROCHLORIDE 20 ML: 10 INJECTION, SOLUTION EPIDURAL; INFILTRATION; INTRACAUDAL; PERINEURAL at 19:50

## 2024-02-05 RX ADMIN — METHYLERGONOVINE MALEATE 200 MCG: 0.2 INJECTION, SOLUTION INTRAMUSCULAR; INTRAVENOUS at 20:01

## 2024-02-05 RX ADMIN — Medication: at 18:57

## 2024-02-05 ASSESSMENT — ACTIVITIES OF DAILY LIVING (ADL)
ADLS_ACUITY_SCORE: 18

## 2024-02-05 NOTE — H&P
"HISTORY AND PHYSICAL UPDATE ADMISSION EXAM    Name: Adina Galvez  YOB: 1990  Medical Record Number: [id number]    History of Present Illness: Adina Galvez is a 33 year old female who is 39w1d pregnant and being admitted for induction of labor, indication hx of 20 week stillbirth. Most recent growth US was 24. EFW was 6#12oz which is 69.4%tile. Placenta is posterior. She is interested in waterbirth. We dicussed that this is not an option if pitocin is still needed during the last phase of labor or if there is anything else that is not WNL. Even if not able to give birth underwater, does want to use water in her labor process.She has used an epidural in the past but did not receive good pain control. She reports an active baby today. Denies headache, visual changes.    Estimated Date of Delivery: 2024    EGA 39w1d    OB History    Para Term  AB Living   4 3 2 1 0 2   SAB IAB Ectopic Multiple Live Births   0 0 0 0 2      # Outcome Date GA Lbr [place]/2nd Weight Sex Delivery Anes PTL Lv   4 Current            3  22 21w0d  0.099 kg (3.5 oz)  Vag-Spont None  FD      Name: ISSAC,PENDING BABY ADINA WOLFE      Apgar1: 0  Apgar5: 0   2 Term         YAW   1 Term      Vag-Spont   YAW        Lab Results   Component Value Date    AS Negative 2024    CHPCRT Negative 2023    GCPCRT Negative 2023    HGB 12.3 2024       Prenatal Complications:   Hx of stillbirth at 21 weeks  GBS pos  Successful ECV at 37 weeks  Desires WB    Exam:      /82 (BP Location: Right arm, Patient Position: Semi-Morgan's, Cuff Size: Adult Regular)   Pulse 110   Temp 98.5  F (36.9  C) (Oral)   Resp 16   Ht 1.638 m (5' 4.5\")   Wt 98.4 kg (217 lb)   SpO2 100%   Breastfeeding No   BMI 36.67 kg/m      Fetal heart Rate Category 1  Contractions occ    HEENT grossly normal  ABD gravid, non-tender  EXT:  mild edema, moves freely  Vaginal exam 3/50/-2/ML/soft. Bishops " 7  Membranes: intact    Assessment: induction of labor, indication hx hx of 20 week loss    Plan: Admit - see IP orders  Labor induction with Pitocin  Pain medication as desired  Prophylactic antibiotic for + GBS status  MD consultant on call Dr. Gamble/ available prn  Anticipate     Prenatal record reviewed.    CATHY Gore CNM      2024   10:58 AM

## 2024-02-05 NOTE — PROGRESS NOTES
Spoke with Rocio noel CNM on call regarding patients plan of care for the day. Winston score is favorable at 7. Plan is to start low dose pitocin IV. Pt is in agreement to this plan.     Shalonda Rodriguez RN

## 2024-02-06 VITALS
WEIGHT: 207.4 LBS | BODY MASS INDEX: 34.55 KG/M2 | HEART RATE: 68 BPM | DIASTOLIC BLOOD PRESSURE: 78 MMHG | HEIGHT: 65 IN | OXYGEN SATURATION: 97 % | SYSTOLIC BLOOD PRESSURE: 128 MMHG | TEMPERATURE: 98 F | RESPIRATION RATE: 17 BRPM

## 2024-02-06 LAB
FLUAV RNA SPEC QL NAA+PROBE: NEGATIVE
FLUBV RNA RESP QL NAA+PROBE: NEGATIVE
HGB BLD-MCNC: 10.4 G/DL (ref 11.7–15.7)
RSV RNA SPEC NAA+PROBE: NEGATIVE
SARS-COV-2 RNA RESP QL NAA+PROBE: NEGATIVE

## 2024-02-06 PROCEDURE — 85018 HEMOGLOBIN: CPT | Performed by: ADVANCED PRACTICE MIDWIFE

## 2024-02-06 PROCEDURE — 36415 COLL VENOUS BLD VENIPUNCTURE: CPT | Performed by: ADVANCED PRACTICE MIDWIFE

## 2024-02-06 PROCEDURE — 250N000013 HC RX MED GY IP 250 OP 250 PS 637: Performed by: ADVANCED PRACTICE MIDWIFE

## 2024-02-06 PROCEDURE — 87637 SARSCOV2&INF A&B&RSV AMP PRB: CPT | Performed by: ADVANCED PRACTICE MIDWIFE

## 2024-02-06 RX ORDER — AZITHROMYCIN 250 MG/1
TABLET, FILM COATED ORAL
Qty: 6 TABLET | Refills: 0 | Status: SHIPPED | OUTPATIENT
Start: 2024-02-06 | End: 2024-02-11

## 2024-02-06 RX ORDER — FERROUS SULFATE 325(65) MG
325 TABLET, DELAYED RELEASE (ENTERIC COATED) ORAL EVERY OTHER DAY
Qty: 60 TABLET | Refills: 0 | Status: SHIPPED | OUTPATIENT
Start: 2024-02-06

## 2024-02-06 RX ORDER — DOCUSATE SODIUM 100 MG/1
100-200 CAPSULE, LIQUID FILLED ORAL 2 TIMES DAILY PRN
Qty: 30 CAPSULE | Refills: 0 | Status: SHIPPED | OUTPATIENT
Start: 2024-02-06

## 2024-02-06 RX ORDER — IBUPROFEN 800 MG/1
800 TABLET, FILM COATED ORAL EVERY 8 HOURS PRN
Qty: 30 TABLET | Refills: 0 | Status: SHIPPED | OUTPATIENT
Start: 2024-02-06

## 2024-02-06 RX ADMIN — DOCUSATE SODIUM 100 MG: 100 CAPSULE, LIQUID FILLED ORAL at 09:36

## 2024-02-06 RX ADMIN — IBUPROFEN 800 MG: 800 TABLET ORAL at 05:51

## 2024-02-06 RX ADMIN — IBUPROFEN 800 MG: 800 TABLET ORAL at 14:14

## 2024-02-06 ASSESSMENT — ACTIVITIES OF DAILY LIVING (ADL)
ADLS_ACUITY_SCORE: 18

## 2024-02-06 NOTE — L&D DELIVERY NOTE
"Vaginal Delivery Note    Name: Adina Galvez  : 1990  MRN: [id number]    PRE DELIVERY DIAGNOSIS  1) 33 year old  4 Para 2102 at 39w1d      2) GBS pos  3) Hx 21 week IUFD    POST DELIVERY DIAGNOSIS  1) 33 year old  @ 39w1d  2) Delivery of a viable infant boy \"Palacios\" weight pending   via     YOB: 2023     Birth Time: 7:37 PM       Augmentation Yes                Induction Yes                      Indication: Hx 21 week IUFD    Monitors: External     GBS: Positive Received 3 doses of PCN    ROM: SROM  Fluid Type: Clear    Labor Analgesia/Anesthesia:Epidural    Cord pH obtained: No  Placenta Date/Time: 2024  7:45 PM   Placenta submitted to Pathology: No    Description of procedure:   33 year old  with PNC w/ MnWC and pregnancy complicated by  hx 21 week IUFD, GBS pos  presented to L&D with plan for induction.  Her hospital course was uncomplicated.  Patient progressed to complete with spontaneous rupture of membranes   Shoulder Dystocia No  Operative Vaginal Delivery No  Infant spontaneously delivered over an  1st degree laceration.   It was repaired in the normal fashion using epidural anesthesia and local anesthesia using 3-0 vicryl.  Infant delivered in CATALINA position.  Nuchal cord Yes x1   Reduced    Placenta spontaneously delivered: 2024  7:45 PM  grossly intact with 3 vessel cord.  Infant:  Live, vigorous infant  was handed to mom.    Delivery was complicated by terminal bradycaardia.     3rd stage interventions included fundal massage, pitocin, and methergine. Gush of blood/amniotic fluid at time of birth was 1000cc. Most of the fluid was blood. Total QBL after repair was 1500. We will count 1000 of that as EBL.    Delivery QBL (mL): 1000cc    Mother and Infant stable.    CATHY Gore CNM    2024 8:34 PM  "

## 2024-02-06 NOTE — ANESTHESIA POSTPROCEDURE EVALUATION
Patient: Adina Galvez    Procedure: * No procedures listed *       Anesthesia Type:  Epidural    Note:  Disposition: Inpatient   Postop Pain Control:    PONV:    Neuro/Psych:    Airway/Respiratory:    CV/Hemodynamics:    Other NRE:    DID A NON-ROUTINE EVENT OCCUR?            Last vitals:  Vitals:    02/05/24 2157 02/06/24 0145 02/06/24 0545   BP: 115/69 117/73 124/65   Pulse:      Resp:  16 16   Temp:  36.7  C (98.1  F) 36.8  C (98.2  F)   SpO2: 100% 100% 100%       Electronically Signed By: Jh Coleman MD  February 6, 2024  6:04 AM

## 2024-02-06 NOTE — PROGRESS NOTES
"Vaginal Delivery Postpartum Day 1    Patient Name:  Adina Galvez  :      1990  MRN:      [id number]      Assessment:  Normal postpartum course  Anemia secondary to acute blood loss    Plan:  Continue current care  Ferrous sulfate every other day  Discharge home tonight if baby able  If still inpatient in the am, will repeat hemoglobin        Subjective:  The patient feels well:  Voiding without difficulty, lochia normal, tolerating normal diet, ambulating without difficulty and passing flatus. Voiding independently without complication. Pain is well controlled with current medications.  The patient has no emotional concerns.  The baby is well and being fed by breast.      YOB: 2023   Birth Time: 7:37 PM     Prenatal Complications include:   Hx of 2nd trimester loss  GBS pos  Successful ECV at 37 weeks    Objective:  /65 (BP Location: Right arm, Patient Position: Semi-Morgan's, Cuff Size: Adult Regular)   Pulse 110   Temp 98.2  F (36.8  C) (Oral)   Resp 16   Ht 1.638 m (5' 4.5\")   Wt 93 kg (205 lb)   SpO2 100%   Breastfeeding Unknown   BMI 34.64 kg/m    Patient Vitals for the past 24 hrs:   BP Temp Temp src Resp SpO2 Weight   24 0545 124/65 98.2  F (36.8  C) Oral 16 100 % 93 kg (205 lb)   24 0145 117/73 98.1  F (36.7  C) Oral 16 100 % --   24 115/69 -- -- -- 100 % --   24 111/63 -- -- -- -- --   24 117/58 -- -- -- -- --   24 125/57 -- -- -- -- --   24 120/60 -- -- -- -- --   24 136/62 -- -- -- -- --   24 139/64 -- -- -- -- --   24 (!) 152/63 -- -- -- -- --   24 116/65 -- -- -- -- --   24 137/62 -- -- -- -- --   24 1900 (!) 158/68 -- -- -- -- --   24 1858 (!) 160/79 -- -- -- 100 % --   243 -- -- -- -- 100 % --   24 1848 -- -- -- -- 100 % --   24 1607 129/78 98  F (36.7  C) Oral 16 -- --   24 1415 120/78 98  F (36.7  C) Oral " 16 -- --   02/05/24 1119 118/69 98.2  F (36.8  C) Oral 16 -- --       Exam: Patient A&O x 3. No acute distress, breathing unlabored.The amount and color of the lochia is appropriate for the duration of recovery. Uterine fundus is firm a  Lab Results   Component Value Date    AS Negative 02/05/2024    CHPCRT Negative 06/24/2023    GCPCRT Negative 06/24/2023    HGB 10.4 (L) 02/06/2024         There is no immunization history on file for this patient.    Provider:  Daly Ojeda CNM    Date:  2/6/2024  Time:  9:31 AM

## 2024-02-06 NOTE — DISCHARGE SUMMARY
OB Discharge Summary        Date:  2024     Name:  Adina Galvez  :  1990  MRN:  [id number]        Admission Date:  2024  Delivery Date: 2024   Gestational Age at Delivery:  39w1d  Discharge Date:  2024     Principal Diagnosis:        Patient Active Problem List   Diagnosis    Fetal demise before 22 weeks with retention of dead fetus    Breech presentation    Encounter for induction of labor   Anemia secondary to acute blood loss        Conditions complicating Pregnancy:   Hx of stillbirth at 21 weeks  GBS pos  Successful ECV at 37 weeks     Indication for :  NA  Indication for Induction:  History of 2nd trimester loss     Condition at Discharge:  Stable     Discharge Medications:       Review of your medicines          START taking         Dose / Directions   docusate sodium 100 MG capsule  Commonly known as: COLACE  Used for:  (normal spontaneous vaginal delivery)       Dose: 100-200 mg  Take 1-2 capsules (100-200 mg) by mouth 2 times daily as needed for constipation  Quantity: 30 capsule  Refills: 0      ferrous sulfate 325 (65 Fe) MG EC tablet  Commonly known as: FE TABS  Used for: Anemia due to blood loss, acute       Dose: 325 mg  Take 1 tablet (325 mg) by mouth every other day  Quantity: 60 tablet  Refills: 0      ibuprofen 800 MG tablet  Commonly known as: ADVIL/MOTRIN  Used for:  (normal spontaneous vaginal delivery)       Dose: 800 mg  Take 1 tablet (800 mg) by mouth every 8 hours as needed for other (cramping)  Quantity: 30 tablet  Refills: 0                CONTINUE these medicines which have NOT CHANGED         Dose / Directions   acetaminophen 325 MG tablet  Commonly known as: TYLENOL  Used for: Fetal demise before 22 weeks with retention of dead fetus       Dose: 650 mg  Take 2 tablets (650 mg) by mouth every 4 hours as needed for mild pain or fever (greater than or equal to 38  C /100.4  F (oral) or 38.5  C/ 101.4  F (core).)  Refills: 0      prenatal  multivitamin w/iron 27-0.8 MG tablet       Dose: 1 tablet  Take 1 tablet by mouth daily  Refills: 0      VITAMIN D (CHOLECALCIFEROL) PO       Dose: 1 capsule  Take 1 capsule by mouth daily  Refills: 0                STOP taking       cetirizine 10 MG tablet  Commonly known as: zyrTEC                       Where to get your medicines          These medications were sent to [id number] - [address]  AT [address] [facility] [address]        Phone: [phone]   docusate sodium 100 MG capsule  ferrous sulfate 325 (65 Fe) MG EC tablet  ibuprofen 800 MG tablet            Discharge Plan:               Follow up with /CELESTINE:  6 weeks              Patient Instructions:                            Physical activity: as tolerated                          Diet:  as tolerated                          Medication: see above                          Other:          Physician/ABDIRIZAKM: Daly Ojeda CNM     Name:  Adina Galvez  :  1990  MRN:  [id number]

## 2024-02-06 NOTE — ANESTHESIA PROCEDURE NOTES
"Epidural catheter Procedure Note    Pre-Procedure   Staff -        Anesthesiologist:  Jh Coleman MD       Performed By: anesthesiologist       Location: OB       Procedure Start/Stop Times: 2/5/2024 6:50 PM and 2/5/2024 7:06 PM       Pre-Anesthestic Checklist: patient identified, IV checked, risks and benefits discussed, informed consent, monitors and equipment checked, pre-op evaluation, at physician/surgeon's request and post-op pain management  Timeout:       Correct Patient: Yes        Correct Procedure: Yes        Correct Site: Yes        Correct Position: Yes   Procedure Documentation  Procedure: epidural catheter       Patient Position: sitting       Skin prep: Chloraprep       Local skin infiltrated with 1.5 mL of 1% lidocaine.        Insertion Site: L2-3. (midline approach).       Technique: LORT saline        CELIA at 4 cm.       Needle Type: Vgifty needle       Needle Gauge: 18.        Needle Length (Inches): 3.5        Catheter: 20 G.          Catheter threaded easily.             # of attempts: 1 and  # of redirects:  0    Assessment/Narrative         Paresthesias: No.       Test dose of 3 mL lidocaine 1.5% w/ 1:200,000 epinephrine at.         Test dose negative, 3 minutes after injection, for signs of intravascular, subdural, or intrathecal injection.       Insertion/Infusion Method: LORT saline       Aspiration negative for Heme or CSF via Epidural Catheter.    Medication(s) Administered   0.125% Bupivacaine + 2 mcg/mL Fentanyl via CADD (Epidural) - EPIDURAL   10 mL - 2/5/2024 7:06:00 PM  Medication Administration Time: 2/5/2024 6:50 PM      FOR [facility] ([facility] [place]:   Pain Team Contact information: please page the Pain Team Via [employer]. Search \"Pain\". During daytime hours, please page the attending first. At night please page the resident first.      "

## 2024-02-06 NOTE — PROGRESS NOTES
OB Discharge Summary      Date:  2024    Name:  Adina Galvez  :  1990  MRN:  [id number]      Admission Date:  2024  Delivery Date: 2024   Gestational Age at Delivery:  39w1d  Discharge Date:  2024    Principal Diagnosis:    Patient Active Problem List   Diagnosis    Fetal demise before 22 weeks with retention of dead fetus    Breech presentation    Encounter for induction of labor   Anemia secondary to acute blood loss      Conditions complicating Pregnancy:   Hx of stillbirth at 21 weeks  GBS pos  Successful ECV at 37 weeks    Indication for :  NA  Indication for Induction:  History of 2nd trimester loss     Condition at Discharge:  Stable    Discharge Medications:      Review of your medicines        START taking        Dose / Directions   docusate sodium 100 MG capsule  Commonly known as: COLACE  Used for:  (normal spontaneous vaginal delivery)      Dose: 100-200 mg  Take 1-2 capsules (100-200 mg) by mouth 2 times daily as needed for constipation  Quantity: 30 capsule  Refills: 0     ferrous sulfate 325 (65 Fe) MG EC tablet  Commonly known as: FE TABS  Used for: Anemia due to blood loss, acute      Dose: 325 mg  Take 1 tablet (325 mg) by mouth every other day  Quantity: 60 tablet  Refills: 0     ibuprofen 800 MG tablet  Commonly known as: ADVIL/MOTRIN  Used for:  (normal spontaneous vaginal delivery)      Dose: 800 mg  Take 1 tablet (800 mg) by mouth every 8 hours as needed for other (cramping)  Quantity: 30 tablet  Refills: 0            CONTINUE these medicines which have NOT CHANGED        Dose / Directions   acetaminophen 325 MG tablet  Commonly known as: TYLENOL  Used for: Fetal demise before 22 weeks with retention of dead fetus      Dose: 650 mg  Take 2 tablets (650 mg) by mouth every 4 hours as needed for mild pain or fever (greater than or equal to 38  C /100.4  F (oral) or 38.5  C/ 101.4  F (core).)  Refills: 0     prenatal multivitamin w/iron 27-0.8 MG  tablet      Dose: 1 tablet  Take 1 tablet by mouth daily  Refills: 0     VITAMIN D (CHOLECALCIFEROL) PO      Dose: 1 capsule  Take 1 capsule by mouth daily  Refills: 0            STOP taking      cetirizine 10 MG tablet  Commonly known as: zyrTEC                  Where to get your medicines        [id number] - [address]  AT [address] [facility] [address]      Phone: [phone]   docusate sodium 100 MG capsule  ferrous sulfate 325 (65 Fe) MG EC tablet  ibuprofen 800 MG tablet          Discharge Plan:    Follow up with /CELESTINE:  6 weeks   Patient Instructions:      Physical activity: as tolerated    Diet:  as tolerated    Medication: see above    Other:        Physician/ABDIRIZAKM: Daly Ojeda CNM    Name:  Adina Galvez  :  1990  MRN:  [id number]

## 2024-02-07 NOTE — PLAN OF CARE
"  Problem: Adult Inpatient Plan of Care  Goal: Plan of Care Review  Description: The Plan of Care Review/Shift note should be completed every shift.  The Outcome Evaluation is a brief statement about your assessment that the patient is improving, declining, or no change.  This information will be displayed automatically on your shift  note.  Outcome: Progressing  Flowsheets (Taken 2/6/2024 2969)  Plan of Care Reviewed With: patient  Goal: Patient-Specific Goal (Individualized)  Description: You can add care plan individualizations to a care plan. Examples of Individualization might be:  \"Parent requests to be called daily at 9am for status\", \"I have a hard time hearing out of my right ear\", or \"Do not touch me to wake me up as it startles  me\".  Outcome: Progressing  Goal: Absence of Hospital-Acquired Illness or Injury  Outcome: Progressing  Goal: Optimal Comfort and Wellbeing  Outcome: Progressing  Goal: Readiness for Transition of Care  Outcome: Progressing   Goal Outcome Evaluation:  Stable vaginal delivery Recovery  Routine postpartum care.  \" I am not having any pains right now.  Patient is resting in bed ar present, vitals are stable, fundus is midline, firm, at 1 below the umbilicus and bleeding is scant. Voiding spontaneously without difficulty, ambulating in room, breast feeding okay, caring and loving her infant. Swab for COVID and Flu, camr back negative.      Plan of Care Reviewed With: patient                 "

## 2024-02-07 NOTE — DISCHARGE INSTRUCTIONS
Warning Signs after Having a Baby    Keep this paper on your fridge or somewhere else where you can see it.    Call your provider if you have any of these symptoms up to 12 weeks after having your baby.    Thoughts of hurting yourself or your baby  Pain in your chest or trouble breathing  Severe headache not helped by pain medicine  Eyesight concerns (blurry vision, seeing spots or flashes of light, other changes to eyesight)  Fainting, shaking or other signs of a seizure    Call 9-1-1 if you feel that it is an emergency.     The symptoms below can happen to anyone after giving birth. They can be very serious. Call your provider if you have any of these warning signs.    My provider s phone number: _______________________    Losing too much blood (hemorrhage)    Call your provider if you soak through a pad in less than an hour or pass blood clots bigger than a golf ball. These may be signs that you are bleeding too much.    Blood clots in the legs or lungs    After you give birth, your body naturally clots its blood to help prevent blood loss. Sometimes this increased clotting can happen in other areas of the body, like the legs or lungs. This can block your blood flow and be very dangerous.     Call your provider if you:  Have a red, swollen spot on the back of your leg that is warm or painful when you touch it.   Are coughing up blood.     Infection    Call your provider if you have any of these symptoms:  Fever of 100.4 F (38 C) or higher.  Pain or redness around your stitches if you had an incision.   Any yellow, white, or green fluid coming from places where you had stitches or surgery.    Mood Problems (postpartum depression)    Many people feel sad or have mood changes after having a baby. But for some people, these mood swings are worse.     Call your provider right away if you feel so anxious or nervous that you can't care for yourself or your baby.    Preeclampsia (high blood pressure)    Even if you  didn't have high blood pressure when you were pregnant, you are at risk for the high blood pressure disease called preeclampsia. This risk can last up to 12 weeks after giving birth.     Call your provider if you have:   Pain on your right side under your rib cage  Sudden swelling in the hands and face    Remember: You know your body. If something doesn't feel right, get medical help.     For informational purposes only. Not to replace the advice of your health care provider. Copyright 2020 [facility]. All rights reserved. Clinically reviewed by Tala Brannon, RNC-OB, MSN. [employer] [id number] - Rev 02/23.

## 2024-02-07 NOTE — PROGRESS NOTES
Discharge education and instruction packet discussed with patient. Patient aware of post partum warning signs. Informed of when and where to follow up with provider for postpartum visit. All patient questions and concerns addressed. Patient verbalizes readiness to discharge to home with infant. ID bands have been verified with infant. All questions answered by RN. Patient discharging to home with  and  [place]

## 2024-03-10 ENCOUNTER — HEALTH MAINTENANCE LETTER (OUTPATIENT)
Age: 34
End: 2024-03-10

## 2025-03-16 ENCOUNTER — HEALTH MAINTENANCE LETTER (OUTPATIENT)
Age: 35
End: 2025-03-16